# Patient Record
Sex: FEMALE | Race: WHITE | NOT HISPANIC OR LATINO | URBAN - METROPOLITAN AREA
[De-identification: names, ages, dates, MRNs, and addresses within clinical notes are randomized per-mention and may not be internally consistent; named-entity substitution may affect disease eponyms.]

---

## 2018-06-16 ENCOUNTER — EMERGENCY (EMERGENCY)
Facility: HOSPITAL | Age: 53
LOS: 1 days | Discharge: HOME | End: 2018-06-16
Attending: EMERGENCY MEDICINE | Admitting: EMERGENCY MEDICINE
Payer: MEDICAID

## 2018-06-16 VITALS
RESPIRATION RATE: 18 BRPM | SYSTOLIC BLOOD PRESSURE: 107 MMHG | OXYGEN SATURATION: 98 % | TEMPERATURE: 98 F | HEART RATE: 98 BPM | DIASTOLIC BLOOD PRESSURE: 78 MMHG

## 2018-06-16 DIAGNOSIS — M54.5 LOW BACK PAIN: ICD-10-CM

## 2018-06-16 DIAGNOSIS — M51.16 INTERVERTEBRAL DISC DISORDERS WITH RADICULOPATHY, LUMBAR REGION: ICD-10-CM

## 2018-06-16 PROCEDURE — 99283 EMERGENCY DEPT VISIT LOW MDM: CPT

## 2018-06-16 RX ORDER — METHOCARBAMOL 500 MG/1
2 TABLET, FILM COATED ORAL
Qty: 40 | Refills: 0
Start: 2018-06-16 | End: 2018-06-20

## 2018-06-16 RX ORDER — METHOCARBAMOL 500 MG/1
1000 TABLET, FILM COATED ORAL ONCE
Qty: 0 | Refills: 0 | Status: COMPLETED | OUTPATIENT
Start: 2018-06-16 | End: 2018-06-16

## 2018-06-16 RX ORDER — OXYCODONE AND ACETAMINOPHEN 5; 325 MG/1; MG/1
1 TABLET ORAL ONCE
Qty: 0 | Refills: 0 | Status: DISCONTINUED | OUTPATIENT
Start: 2018-06-16 | End: 2018-06-16

## 2018-06-16 RX ORDER — ACETAMINOPHEN 500 MG
650 TABLET ORAL ONCE
Qty: 0 | Refills: 0 | Status: COMPLETED | OUTPATIENT
Start: 2018-06-16 | End: 2018-06-16

## 2018-06-16 RX ADMIN — Medication 650 MILLIGRAM(S): at 14:01

## 2018-06-16 RX ADMIN — OXYCODONE AND ACETAMINOPHEN 1 TABLET(S): 5; 325 TABLET ORAL at 13:39

## 2018-06-16 RX ADMIN — Medication 500 MILLIGRAM(S): at 14:01

## 2018-06-16 RX ADMIN — METHOCARBAMOL 1000 MILLIGRAM(S): 500 TABLET, FILM COATED ORAL at 14:01

## 2018-06-16 NOTE — ED PROVIDER NOTE - MEDICAL DECISION MAKING DETAILS
pain controlled in ED, ambulatory with steady gait, stable for dc home, outpatient back pain followup.

## 2018-06-16 NOTE — ED PROVIDER NOTE - OBJECTIVE STATEMENT
52 y.o. female with c/o exacerbation of chronic L sided lower back pain with sciatica, has had MRI and XR recently which showed DJD/disc disease of L2-L3, has been taking NSAIDS/meloxicam and cyclobenzaprine with no relief of sx.  No numbness or tingling, no weakness, no fatigue, no bowel or bladder incontinence, no urinary retention, no fall no trauma no LOC no seizures.  On arrival into the exam room, pt is laying with her head at the foot of the bed on an upright stretcher.

## 2019-08-29 ENCOUNTER — EMERGENCY (EMERGENCY)
Facility: HOSPITAL | Age: 54
LOS: 1 days | Discharge: HOME | End: 2019-08-29
Attending: EMERGENCY MEDICINE | Admitting: EMERGENCY MEDICINE
Payer: MEDICAID

## 2019-08-29 VITALS
SYSTOLIC BLOOD PRESSURE: 122 MMHG | HEART RATE: 83 BPM | TEMPERATURE: 99 F | DIASTOLIC BLOOD PRESSURE: 84 MMHG | OXYGEN SATURATION: 96 % | RESPIRATION RATE: 18 BRPM

## 2019-08-29 LAB
ALBUMIN SERPL ELPH-MCNC: 3.8 G/DL — SIGNIFICANT CHANGE UP (ref 3.4–5)
ALP SERPL-CCNC: 80 U/L — SIGNIFICANT CHANGE UP (ref 40–120)
ALT FLD-CCNC: 17 U/L — SIGNIFICANT CHANGE UP (ref 12–42)
ANION GAP SERPL CALC-SCNC: 6 MMOL/L — LOW (ref 9–16)
AST SERPL-CCNC: 10 U/L — LOW (ref 15–37)
BASOPHILS NFR BLD AUTO: 0.5 % — SIGNIFICANT CHANGE UP (ref 0–2)
BILIRUB SERPL-MCNC: 0.3 MG/DL — SIGNIFICANT CHANGE UP (ref 0.2–1.2)
BUN SERPL-MCNC: 21 MG/DL — SIGNIFICANT CHANGE UP (ref 7–23)
CALCIUM SERPL-MCNC: 12.3 MG/DL — HIGH (ref 8.5–10.5)
CHLORIDE SERPL-SCNC: 106 MMOL/L — SIGNIFICANT CHANGE UP (ref 96–108)
CO2 SERPL-SCNC: 34 MMOL/L — HIGH (ref 22–31)
CREAT SERPL-MCNC: 0.71 MG/DL — SIGNIFICANT CHANGE UP (ref 0.5–1.3)
EOSINOPHIL NFR BLD AUTO: 2 % — SIGNIFICANT CHANGE UP (ref 0–6)
GLUCOSE SERPL-MCNC: 93 MG/DL — SIGNIFICANT CHANGE UP (ref 70–99)
HCT VFR BLD CALC: 40.8 % — SIGNIFICANT CHANGE UP (ref 34.5–45)
HGB BLD-MCNC: 13.9 G/DL — SIGNIFICANT CHANGE UP (ref 11.5–15.5)
IMM GRANULOCYTES NFR BLD AUTO: 0.4 % — SIGNIFICANT CHANGE UP (ref 0–1.5)
LYMPHOCYTES # BLD AUTO: 33.9 % — SIGNIFICANT CHANGE UP (ref 13–44)
MAGNESIUM SERPL-MCNC: 1.9 MG/DL — SIGNIFICANT CHANGE UP (ref 1.6–2.6)
MCHC RBC-ENTMCNC: 31.1 PG — SIGNIFICANT CHANGE UP (ref 27–34)
MCHC RBC-ENTMCNC: 34.1 G/DL — SIGNIFICANT CHANGE UP (ref 32–36)
MCV RBC AUTO: 91.3 FL — SIGNIFICANT CHANGE UP (ref 80–100)
MONOCYTES NFR BLD AUTO: 6.8 % — SIGNIFICANT CHANGE UP (ref 2–14)
NEUTROPHILS NFR BLD AUTO: 56.4 % — SIGNIFICANT CHANGE UP (ref 43–77)
PHOSPHATE SERPL-MCNC: 4.4 MG/DL — SIGNIFICANT CHANGE UP (ref 2.5–4.5)
PLATELET # BLD AUTO: 260 K/UL — SIGNIFICANT CHANGE UP (ref 150–400)
POTASSIUM SERPL-MCNC: 4.6 MMOL/L — SIGNIFICANT CHANGE UP (ref 3.5–5.3)
POTASSIUM SERPL-SCNC: 4.6 MMOL/L — SIGNIFICANT CHANGE UP (ref 3.5–5.3)
PROT SERPL-MCNC: 7.2 G/DL — SIGNIFICANT CHANGE UP (ref 6.4–8.2)
RBC # BLD: 4.47 M/UL — SIGNIFICANT CHANGE UP (ref 3.8–5.2)
RBC # FLD: 13.8 % — SIGNIFICANT CHANGE UP (ref 10.3–14.5)
SODIUM SERPL-SCNC: 146 MMOL/L — HIGH (ref 132–145)
TSH SERPL-MCNC: 1.64 UIU/ML — SIGNIFICANT CHANGE UP (ref 0.36–3.74)
WBC # BLD: 10.1 K/UL — SIGNIFICANT CHANGE UP (ref 3.8–10.5)
WBC # FLD AUTO: 10.1 K/UL — SIGNIFICANT CHANGE UP (ref 3.8–10.5)

## 2019-08-29 PROCEDURE — 99284 EMERGENCY DEPT VISIT MOD MDM: CPT | Mod: 25

## 2019-08-29 PROCEDURE — 93010 ELECTROCARDIOGRAM REPORT: CPT

## 2019-08-29 PROCEDURE — 71046 X-RAY EXAM CHEST 2 VIEWS: CPT | Mod: 26

## 2019-08-29 RX ORDER — SODIUM CHLORIDE 9 MG/ML
1000 INJECTION INTRAMUSCULAR; INTRAVENOUS; SUBCUTANEOUS ONCE
Refills: 0 | Status: COMPLETED | OUTPATIENT
Start: 2019-08-29 | End: 2019-08-29

## 2019-08-29 RX ORDER — SODIUM CHLORIDE 9 MG/ML
2000 INJECTION INTRAMUSCULAR; INTRAVENOUS; SUBCUTANEOUS ONCE
Refills: 0 | Status: COMPLETED | OUTPATIENT
Start: 2019-08-29 | End: 2019-08-29

## 2019-08-29 RX ORDER — FUROSEMIDE 40 MG
20 TABLET ORAL ONCE
Refills: 0 | Status: COMPLETED | OUTPATIENT
Start: 2019-08-29 | End: 2019-08-29

## 2019-08-29 RX ADMIN — Medication 20 MILLIGRAM(S): at 23:34

## 2019-08-29 RX ADMIN — SODIUM CHLORIDE 2000 MILLILITER(S): 9 INJECTION INTRAMUSCULAR; INTRAVENOUS; SUBCUTANEOUS at 23:34

## 2019-08-29 RX ADMIN — SODIUM CHLORIDE 1000 MILLILITER(S): 9 INJECTION INTRAMUSCULAR; INTRAVENOUS; SUBCUTANEOUS at 23:30

## 2019-08-29 RX ADMIN — SODIUM CHLORIDE 1000 MILLILITER(S): 9 INJECTION INTRAMUSCULAR; INTRAVENOUS; SUBCUTANEOUS at 23:22

## 2019-08-29 NOTE — ED ADULT NURSE NOTE - NS ED NURSE IV DC DT
Patient is looking for a refill of :   ADAlimumab (HUMIRA PEN) 40 MG/0.8ML pen-injector kit ,   needs to be sent to [address] [place] [address] fax number is [phone]     [facility] is the pharmacy they then send it to and the phone number for them is : [phone]     Please advise.     Thank you 30-Aug-2019 05:00

## 2019-08-29 NOTE — ED ADULT NURSE NOTE - OBJECTIVE STATEMENT
Pt sent PMD for abnormal labs.  In particular elevated calcium.  Pt with no complaints of pain.  No SOB.

## 2019-08-29 NOTE — ED PROVIDER NOTE - PATIENT PORTAL LINK FT
You can access the FollowMyHealth Patient Portal offered by [facility] by registering at the following website: [email or web address]. By joining [employer]’s FollowMyHealth portal, you will also be able to view your health information using other applications (apps) compatible with our system.

## 2019-08-29 NOTE — ED PROVIDER NOTE - OBJECTIVE STATEMENT
52 yo female pt, hx of OA and psoriatic arthritis, presents to ED after being called by Rheumatologist and told to come in to get evaluated for hypercalcemia on outpt labs. Pt got labs done to get started on biologics for Psoriatic arthritis. Pt denies any symptoms like cramping, paresthesias, etc. no chest pain, no sob, no neck  pain. Hx of hypothyroidism and OA on neck and lower back w hx of lower back surgery

## 2019-08-29 NOTE — ED PROVIDER NOTE - PROGRESS NOTE DETAILS
Pt with isolated asymptomatic hypercalcemia, will hydrate and give one dose of furosemide and repeat. EKG w no changes. Explained to pt extensive DDx include high PTH, malignancy, etc. She is aware that if repeat is better she might follow up with primary care for rest of work up. Will sign out pending tx and repeat BMP.

## 2019-08-29 NOTE — ED ADULT NURSE NOTE - NSIMPLEMENTINTERV_GEN_ALL_ED
Implemented All Universal Safety Interventions:  [place] to call system. Call bell, personal items and telephone within reach. Instruct patient to call for assistance. Room bathroom lighting operational. Non-slip footwear when patient is off stretcher. Physically safe environment: no spills, clutter or unnecessary equipment. Stretcher in lowest position, wheels locked, appropriate side rails in place.

## 2019-08-29 NOTE — ED ADULT TRIAGE NOTE - CHIEF COMPLAINT QUOTE
Pt complaining abnormal lab results. Pt was told to go to emergency room by her rheumatologist for calcium of  12.8 . Pt denies chest pain sob.

## 2019-08-30 VITALS
TEMPERATURE: 98 F | HEART RATE: 79 BPM | OXYGEN SATURATION: 99 % | RESPIRATION RATE: 16 BRPM | SYSTOLIC BLOOD PRESSURE: 100 MMHG | DIASTOLIC BLOOD PRESSURE: 60 MMHG

## 2019-08-30 PROBLEM — M54.5 LOW BACK PAIN: Chronic | Status: ACTIVE | Noted: 2018-06-16

## 2019-08-30 PROBLEM — M51.16 INTERVERTEBRAL DISC DISORDERS WITH RADICULOPATHY, LUMBAR REGION: Chronic | Status: ACTIVE | Noted: 2018-06-16

## 2019-08-30 LAB
ANION GAP SERPL CALC-SCNC: 3 MMOL/L — LOW (ref 9–16)
BUN SERPL-MCNC: 16 MG/DL — SIGNIFICANT CHANGE UP (ref 7–23)
CA-I BLD-SCNC: 1.55 MMOL/L — HIGH (ref 1.12–1.3)
CALCIUM SERPL-MCNC: 10.8 MG/DL — HIGH (ref 8.5–10.5)
CHLORIDE SERPL-SCNC: 108 MMOL/L — SIGNIFICANT CHANGE UP (ref 96–108)
CO2 SERPL-SCNC: 34 MMOL/L — HIGH (ref 22–31)
CREAT SERPL-MCNC: 0.59 MG/DL — SIGNIFICANT CHANGE UP (ref 0.5–1.3)
GLUCOSE SERPL-MCNC: 84 MG/DL — SIGNIFICANT CHANGE UP (ref 70–99)
POTASSIUM SERPL-MCNC: 4.1 MMOL/L — SIGNIFICANT CHANGE UP (ref 3.5–5.3)
POTASSIUM SERPL-SCNC: 4.1 MMOL/L — SIGNIFICANT CHANGE UP (ref 3.5–5.3)
SODIUM SERPL-SCNC: 145 MMOL/L — SIGNIFICANT CHANGE UP (ref 132–145)

## 2019-08-30 PROCEDURE — 74177 CT ABD & PELVIS W/CONTRAST: CPT | Mod: 26

## 2019-08-30 PROCEDURE — 71260 CT THORAX DX C+: CPT | Mod: 26

## 2019-09-02 DIAGNOSIS — Z79.899 OTHER LONG TERM (CURRENT) DRUG THERAPY: ICD-10-CM

## 2019-09-02 DIAGNOSIS — R79.89 OTHER SPECIFIED ABNORMAL FINDINGS OF BLOOD CHEMISTRY: ICD-10-CM

## 2019-09-02 DIAGNOSIS — Z88.0 ALLERGY STATUS TO PENICILLIN: ICD-10-CM

## 2019-09-02 DIAGNOSIS — Z79.891 LONG TERM (CURRENT) USE OF OPIATE ANALGESIC: ICD-10-CM

## 2019-09-02 DIAGNOSIS — E83.52 HYPERCALCEMIA: ICD-10-CM

## 2019-09-02 DIAGNOSIS — Z79.1 LONG TERM (CURRENT) USE OF NON-STEROIDAL ANTI-INFLAMMATORIES (NSAID): ICD-10-CM

## 2019-09-13 ENCOUNTER — INPATIENT (INPATIENT)
Facility: HOSPITAL | Age: 54
LOS: 3 days | Discharge: HOME | DRG: 918 | End: 2019-09-17
Attending: STUDENT IN AN ORGANIZED HEALTH CARE EDUCATION/TRAINING PROGRAM | Admitting: STUDENT IN AN ORGANIZED HEALTH CARE EDUCATION/TRAINING PROGRAM
Payer: COMMERCIAL

## 2019-09-13 VITALS
DIASTOLIC BLOOD PRESSURE: 84 MMHG | OXYGEN SATURATION: 98 % | TEMPERATURE: 99 F | HEART RATE: 85 BPM | SYSTOLIC BLOOD PRESSURE: 138 MMHG | RESPIRATION RATE: 18 BRPM

## 2019-09-13 DIAGNOSIS — L40.50 ARTHROPATHIC PSORIASIS, UNSPECIFIED: ICD-10-CM

## 2019-09-13 DIAGNOSIS — Z91.89 OTHER SPECIFIED PERSONAL RISK FACTORS, NOT ELSEWHERE CLASSIFIED: ICD-10-CM

## 2019-09-13 DIAGNOSIS — F17.200 NICOTINE DEPENDENCE, UNSPECIFIED, UNCOMPLICATED: ICD-10-CM

## 2019-09-13 DIAGNOSIS — K59.00 CONSTIPATION, UNSPECIFIED: ICD-10-CM

## 2019-09-13 DIAGNOSIS — R63.8 OTHER SYMPTOMS AND SIGNS CONCERNING FOOD AND FLUID INTAKE: ICD-10-CM

## 2019-09-13 DIAGNOSIS — Z98.82 BREAST IMPLANT STATUS: Chronic | ICD-10-CM

## 2019-09-13 DIAGNOSIS — E83.52 HYPERCALCEMIA: ICD-10-CM

## 2019-09-13 DIAGNOSIS — Z29.9 ENCOUNTER FOR PROPHYLACTIC MEASURES, UNSPECIFIED: ICD-10-CM

## 2019-09-13 DIAGNOSIS — M54.5 LOW BACK PAIN: ICD-10-CM

## 2019-09-13 DIAGNOSIS — R93.89 ABNORMAL FINDINGS ON DIAGNOSTIC IMAGING OF OTHER SPECIFIED BODY STRUCTURES: ICD-10-CM

## 2019-09-13 DIAGNOSIS — J43.9 EMPHYSEMA, UNSPECIFIED: ICD-10-CM

## 2019-09-13 DIAGNOSIS — Z98.890 OTHER SPECIFIED POSTPROCEDURAL STATES: Chronic | ICD-10-CM

## 2019-09-13 LAB
ALBUMIN SERPL ELPH-MCNC: 3.2 G/DL — LOW (ref 3.4–5)
ALBUMIN SERPL ELPH-MCNC: 3.5 G/DL — SIGNIFICANT CHANGE UP (ref 3.3–5)
ALP SERPL-CCNC: 69 U/L — SIGNIFICANT CHANGE UP (ref 40–120)
ALP SERPL-CCNC: 90 U/L — SIGNIFICANT CHANGE UP (ref 40–120)
ALT FLD-CCNC: 17 U/L — SIGNIFICANT CHANGE UP (ref 10–45)
ALT FLD-CCNC: 19 U/L — SIGNIFICANT CHANGE UP (ref 12–42)
ANION GAP SERPL CALC-SCNC: 4 MMOL/L — LOW (ref 9–16)
ANION GAP SERPL CALC-SCNC: 5 MMOL/L — LOW (ref 9–16)
ANION GAP SERPL CALC-SCNC: 5 MMOL/L — LOW (ref 9–16)
ANION GAP SERPL CALC-SCNC: 7 MMOL/L — LOW (ref 9–16)
ANION GAP SERPL CALC-SCNC: 7 MMOL/L — SIGNIFICANT CHANGE UP (ref 5–17)
ANION GAP SERPL CALC-SCNC: 9 MMOL/L — SIGNIFICANT CHANGE UP (ref 5–17)
AST SERPL-CCNC: 14 U/L — LOW (ref 15–37)
AST SERPL-CCNC: 16 U/L — SIGNIFICANT CHANGE UP (ref 10–40)
BILIRUB SERPL-MCNC: 0.1 MG/DL — LOW (ref 0.2–1.2)
BILIRUB SERPL-MCNC: <0.2 MG/DL — SIGNIFICANT CHANGE UP (ref 0.2–1.2)
BUN SERPL-MCNC: 25 MG/DL — HIGH (ref 7–23)
BUN SERPL-MCNC: 26 MG/DL — HIGH (ref 7–23)
BUN SERPL-MCNC: 28 MG/DL — HIGH (ref 7–23)
BUN SERPL-MCNC: 29 MG/DL — HIGH (ref 7–23)
BUN SERPL-MCNC: 31 MG/DL — HIGH (ref 7–23)
BUN SERPL-MCNC: 32 MG/DL — HIGH (ref 7–23)
CA-I BLD-SCNC: 2.14 MMOL/L — CRITICAL HIGH (ref 1.12–1.3)
CALCIUM SERPL-MCNC: 14.5 MG/DL — CRITICAL HIGH (ref 8.5–10.5)
CALCIUM SERPL-MCNC: 15.1 MG/DL — CRITICAL HIGH (ref 8.4–10.5)
CALCIUM SERPL-MCNC: 15.2 MG/DL — CRITICAL HIGH (ref 8.4–10.5)
CALCIUM SERPL-MCNC: >15 MG/DL — CRITICAL HIGH (ref 8.5–10.5)
CHLORIDE SERPL-SCNC: 104 MMOL/L — SIGNIFICANT CHANGE UP (ref 96–108)
CHLORIDE SERPL-SCNC: 105 MMOL/L — SIGNIFICANT CHANGE UP (ref 96–108)
CHLORIDE SERPL-SCNC: 107 MMOL/L — SIGNIFICANT CHANGE UP (ref 96–108)
CHLORIDE SERPL-SCNC: 107 MMOL/L — SIGNIFICANT CHANGE UP (ref 96–108)
CHLORIDE SERPL-SCNC: 109 MMOL/L — HIGH (ref 96–108)
CHLORIDE SERPL-SCNC: 110 MMOL/L — HIGH (ref 96–108)
CO2 SERPL-SCNC: 28 MMOL/L — SIGNIFICANT CHANGE UP (ref 22–31)
CO2 SERPL-SCNC: 29 MMOL/L — SIGNIFICANT CHANGE UP (ref 22–31)
CO2 SERPL-SCNC: 30 MMOL/L — SIGNIFICANT CHANGE UP (ref 22–31)
CO2 SERPL-SCNC: 30 MMOL/L — SIGNIFICANT CHANGE UP (ref 22–31)
CO2 SERPL-SCNC: 31 MMOL/L — SIGNIFICANT CHANGE UP (ref 22–31)
CO2 SERPL-SCNC: 31 MMOL/L — SIGNIFICANT CHANGE UP (ref 22–31)
CREAT SERPL-MCNC: 1.11 MG/DL — SIGNIFICANT CHANGE UP (ref 0.5–1.3)
CREAT SERPL-MCNC: 1.12 MG/DL — SIGNIFICANT CHANGE UP (ref 0.5–1.3)
CREAT SERPL-MCNC: 1.17 MG/DL — SIGNIFICANT CHANGE UP (ref 0.5–1.3)
CREAT SERPL-MCNC: 1.19 MG/DL — SIGNIFICANT CHANGE UP (ref 0.5–1.3)
CREAT SERPL-MCNC: 1.2 MG/DL — SIGNIFICANT CHANGE UP (ref 0.5–1.3)
CREAT SERPL-MCNC: 1.24 MG/DL — SIGNIFICANT CHANGE UP (ref 0.5–1.3)
GLUCOSE SERPL-MCNC: 100 MG/DL — HIGH (ref 70–99)
GLUCOSE SERPL-MCNC: 115 MG/DL — HIGH (ref 70–99)
GLUCOSE SERPL-MCNC: 121 MG/DL — HIGH (ref 70–99)
GLUCOSE SERPL-MCNC: 133 MG/DL — HIGH (ref 70–99)
GLUCOSE SERPL-MCNC: 150 MG/DL — HIGH (ref 70–99)
GLUCOSE SERPL-MCNC: 166 MG/DL — HIGH (ref 70–99)
HCT VFR BLD CALC: 30.9 % — LOW (ref 34.5–45)
HCT VFR BLD CALC: 32.6 % — LOW (ref 34.5–45)
HGB BLD-MCNC: 10.3 G/DL — LOW (ref 11.5–15.5)
HGB BLD-MCNC: 11.4 G/DL — LOW (ref 11.5–15.5)
MAGNESIUM SERPL-MCNC: 1.8 MG/DL — SIGNIFICANT CHANGE UP (ref 1.6–2.6)
MAGNESIUM SERPL-MCNC: 1.8 MG/DL — SIGNIFICANT CHANGE UP (ref 1.6–2.6)
MCHC RBC-ENTMCNC: 31.4 PG — SIGNIFICANT CHANGE UP (ref 27–34)
MCHC RBC-ENTMCNC: 31.7 PG — SIGNIFICANT CHANGE UP (ref 27–34)
MCHC RBC-ENTMCNC: 33.3 GM/DL — SIGNIFICANT CHANGE UP (ref 32–36)
MCHC RBC-ENTMCNC: 35 G/DL — SIGNIFICANT CHANGE UP (ref 32–36)
MCV RBC AUTO: 90.6 FL — SIGNIFICANT CHANGE UP (ref 80–100)
MCV RBC AUTO: 94.2 FL — SIGNIFICANT CHANGE UP (ref 80–100)
NRBC # BLD: 0 /100 WBCS — SIGNIFICANT CHANGE UP (ref 0–0)
PHOSPHATE SERPL-MCNC: 3.7 MG/DL — SIGNIFICANT CHANGE UP (ref 2.5–4.5)
PLATELET # BLD AUTO: 212 K/UL — SIGNIFICANT CHANGE UP (ref 150–400)
PLATELET # BLD AUTO: 238 K/UL — SIGNIFICANT CHANGE UP (ref 150–400)
POTASSIUM SERPL-MCNC: 3.1 MMOL/L — LOW (ref 3.5–5.3)
POTASSIUM SERPL-MCNC: 3.6 MMOL/L — SIGNIFICANT CHANGE UP (ref 3.5–5.3)
POTASSIUM SERPL-MCNC: 3.6 MMOL/L — SIGNIFICANT CHANGE UP (ref 3.5–5.3)
POTASSIUM SERPL-MCNC: 3.8 MMOL/L — SIGNIFICANT CHANGE UP (ref 3.5–5.3)
POTASSIUM SERPL-MCNC: 3.9 MMOL/L — SIGNIFICANT CHANGE UP (ref 3.5–5.3)
POTASSIUM SERPL-MCNC: 4 MMOL/L — SIGNIFICANT CHANGE UP (ref 3.5–5.3)
POTASSIUM SERPL-SCNC: 3.1 MMOL/L — LOW (ref 3.5–5.3)
POTASSIUM SERPL-SCNC: 3.6 MMOL/L — SIGNIFICANT CHANGE UP (ref 3.5–5.3)
POTASSIUM SERPL-SCNC: 3.6 MMOL/L — SIGNIFICANT CHANGE UP (ref 3.5–5.3)
POTASSIUM SERPL-SCNC: 3.8 MMOL/L — SIGNIFICANT CHANGE UP (ref 3.5–5.3)
POTASSIUM SERPL-SCNC: 3.9 MMOL/L — SIGNIFICANT CHANGE UP (ref 3.5–5.3)
POTASSIUM SERPL-SCNC: 4 MMOL/L — SIGNIFICANT CHANGE UP (ref 3.5–5.3)
PROT SERPL-MCNC: 5.9 G/DL — LOW (ref 6–8.3)
PROT SERPL-MCNC: 6.7 G/DL — SIGNIFICANT CHANGE UP (ref 6.4–8.2)
RBC # BLD: 3.28 M/UL — LOW (ref 3.8–5.2)
RBC # BLD: 3.6 M/UL — LOW (ref 3.8–5.2)
RBC # FLD: 12.6 % — SIGNIFICANT CHANGE UP (ref 10.3–14.5)
RBC # FLD: 12.8 % — SIGNIFICANT CHANGE UP (ref 10.3–14.5)
SODIUM SERPL-SCNC: 140 MMOL/L — SIGNIFICANT CHANGE UP (ref 132–145)
SODIUM SERPL-SCNC: 142 MMOL/L — SIGNIFICANT CHANGE UP (ref 132–145)
SODIUM SERPL-SCNC: 142 MMOL/L — SIGNIFICANT CHANGE UP (ref 135–145)
SODIUM SERPL-SCNC: 144 MMOL/L — SIGNIFICANT CHANGE UP (ref 132–145)
SODIUM SERPL-SCNC: 145 MMOL/L — SIGNIFICANT CHANGE UP (ref 132–145)
SODIUM SERPL-SCNC: 145 MMOL/L — SIGNIFICANT CHANGE UP (ref 135–145)
WBC # BLD: 10 K/UL — SIGNIFICANT CHANGE UP (ref 3.8–10.5)
WBC # BLD: 7.98 K/UL — SIGNIFICANT CHANGE UP (ref 3.8–10.5)
WBC # FLD AUTO: 10 K/UL — SIGNIFICANT CHANGE UP (ref 3.8–10.5)
WBC # FLD AUTO: 7.98 K/UL — SIGNIFICANT CHANGE UP (ref 3.8–10.5)

## 2019-09-13 PROCEDURE — 93010 ELECTROCARDIOGRAM REPORT: CPT

## 2019-09-13 PROCEDURE — 99285 EMERGENCY DEPT VISIT HI MDM: CPT | Mod: 25

## 2019-09-13 PROCEDURE — 99222 1ST HOSP IP/OBS MODERATE 55: CPT | Mod: GC

## 2019-09-13 PROCEDURE — 93010 ELECTROCARDIOGRAM REPORT: CPT | Mod: 76

## 2019-09-13 RX ORDER — ENOXAPARIN SODIUM 100 MG/ML
40 INJECTION SUBCUTANEOUS DAILY
Refills: 0 | Status: DISCONTINUED | OUTPATIENT
Start: 2019-09-13 | End: 2019-09-16

## 2019-09-13 RX ORDER — POTASSIUM CHLORIDE 20 MEQ
60 PACKET (EA) ORAL ONCE
Refills: 0 | Status: COMPLETED | OUTPATIENT
Start: 2019-09-13 | End: 2019-09-13

## 2019-09-13 RX ORDER — SODIUM CHLORIDE 9 MG/ML
1000 INJECTION INTRAMUSCULAR; INTRAVENOUS; SUBCUTANEOUS ONCE
Refills: 0 | Status: COMPLETED | OUTPATIENT
Start: 2019-09-13 | End: 2019-09-13

## 2019-09-13 RX ORDER — PAMIDRONATE DISODIUM 9 MG/ML
60 INJECTION, SOLUTION INTRAVENOUS ONCE
Refills: 0 | Status: COMPLETED | OUTPATIENT
Start: 2019-09-13 | End: 2019-09-13

## 2019-09-13 RX ORDER — ACETAMINOPHEN 500 MG
975 TABLET ORAL ONCE
Refills: 0 | Status: COMPLETED | OUTPATIENT
Start: 2019-09-13 | End: 2019-09-13

## 2019-09-13 RX ORDER — SODIUM CHLORIDE 9 MG/ML
1000 INJECTION INTRAMUSCULAR; INTRAVENOUS; SUBCUTANEOUS
Refills: 0 | Status: DISCONTINUED | OUTPATIENT
Start: 2019-09-13 | End: 2019-09-14

## 2019-09-13 RX ADMIN — SODIUM CHLORIDE 150 MILLILITER(S): 9 INJECTION INTRAMUSCULAR; INTRAVENOUS; SUBCUTANEOUS at 19:42

## 2019-09-13 RX ADMIN — SODIUM CHLORIDE 1000 MILLILITER(S): 9 INJECTION INTRAMUSCULAR; INTRAVENOUS; SUBCUTANEOUS at 01:39

## 2019-09-13 RX ADMIN — SODIUM CHLORIDE 1000 MILLILITER(S): 9 INJECTION INTRAMUSCULAR; INTRAVENOUS; SUBCUTANEOUS at 03:42

## 2019-09-13 RX ADMIN — Medication 975 MILLIGRAM(S): at 01:38

## 2019-09-13 RX ADMIN — ENOXAPARIN SODIUM 40 MILLIGRAM(S): 100 INJECTION SUBCUTANEOUS at 23:51

## 2019-09-13 RX ADMIN — Medication 60 MILLIEQUIVALENT(S): at 03:40

## 2019-09-13 RX ADMIN — SODIUM CHLORIDE 1000 MILLILITER(S): 9 INJECTION INTRAMUSCULAR; INTRAVENOUS; SUBCUTANEOUS at 02:47

## 2019-09-13 RX ADMIN — SODIUM CHLORIDE 150 MILLILITER(S): 9 INJECTION INTRAMUSCULAR; INTRAVENOUS; SUBCUTANEOUS at 23:51

## 2019-09-13 NOTE — ED ADULT NURSE NOTE - CHPI ED NUR SYMPTOMS NEG
no chills/no dizziness/no tingling/no nausea/no pain/no fever/no vomiting/no weakness/no decreased eating/drinking

## 2019-09-13 NOTE — CONSULT NOTE ADULT - SUBJECTIVE AND OBJECTIVE BOX
HPI: 54yFemale    Age at Dx:  How dx:  Hx and duration of insulin:  Current Therapy:  Hx of hypoglycemia  Hx of DKA/HHS?    Home FSG:  Fasting  Lunch  Dinner  Bed    Hx of other regimens  Complications:  Outpatient Endo:    PMH & Surgical Hx:ABNORMAL LAB RESULT  Handoff  MEWS Score  Chronic lower back pain  Lumbar disc herniation with radiculopathy  Hypercalcemia  No significant past surgical history  ABNORMAL LAB RESULT  14  Hypokalemia      FH:  DM:  Thyroid:  Autoimmune:  Other:    SH:  Smoking  Etoh:  Recreational Drugs:  Social Life:    Current Meds:  sodium chloride 0.9%. 1000 milliLiter(s) IV Continuous <Continuous>      Allergies:  penicillins (Unknown)      ROS:  Denies the following except as indicated.    General: weight loss/weight gain, decreased appetite, fatigue  Eyes: Blurry vision, double vision, visual changes  ENT: Throat pain, changes in voice,   CV: palpitations, SOB, CP, cough  GI: NVD, difficulty swallowing, abdominal pain  : polyuria, dysuria  Endo: abnormal menses, temperature intolerance, decreased libido  MSK: weakness, joint pain  Skin: rash, dryness, diaphoresis  Heme: Easy bruising,bleeding  Neuro: HA, dizziness, lightheadedness, numbness tingling  Psych: Anxiety, Depression    Vital Signs Last 24 Hrs  T(C): 36.4 (13 Sep 2019 18:10), Max: 37.1 (13 Sep 2019 00:03)  T(F): 97.6 (13 Sep 2019 18:10), Max: 98.7 (13 Sep 2019 00:03)  HR: 76 (13 Sep 2019 18:10) (67 - 85)  BP: 144/85 (13 Sep 2019 18:10) (109/66 - 144/85)  BP(mean): --  RR: 18 (13 Sep 2019 18:10) (16 - 20)  SpO2: 96% (13 Sep 2019 18:10) (96% - 100%)  Height (cm): 162.6 (09-13 @ 18:10)  Weight (kg): 56.245 (09-13 @ 18:10)  BMI (kg/m2): 21.3 (09-13 @ 18:10)      Constitutional: wn/wd in NAD.   HEENT: NCAT, MMM, OP clear, EOMI, , no proptosis or lid retraction  Neck: no thyromegaly or palpable thyroid nodules   Respiratory: lungs CTAB.  Cardiovascular: regular rhythm, normal S1 and S2, no audible murmurs, no peripheral edema  GI: soft, NT/ND, no masses/HSM appreciated.  Neurology: no tremors, DTR 2+  Skin: no visible rashes/lesions  Psychiatric: AAO x 3, normal affect/mood.  Ext: radial pulses intact, DP pulses intact, extremities warm, no cyanosis, clubbing or edema.       LABS:                        11.4   10.0  )-----------( 238      ( 13 Sep 2019 00:54 )             32.6     09-13    144  |  110<H>  |  29<H>  ----------------------------<  115<H>  3.9   |  30  |  1.17    Ca    14.5<HH>      13 Sep 2019 07:13  Phos  3.7     09-13  Mg     1.8     09-13    TPro  6.7  /  Alb  3.2<L>  /  TBili  0.1<L>  /  DBili  x   /  AST  14<L>  /  ALT  19  /  AlkPhos  90  09-13          Thyroid Stimulating Hormone, Serum: 1.640 (08-29 @ 22:52)      RADIOLOGY & ADDITIONAL STUDIES:  CAPILLARY BLOOD GLUCOSE            A/P:54y Female    1.      Will continue to monitor     For discharge, pt can continue    Pt can follow up at discharge with [facility] by calling  to make an appointment.   Will discuss case with     and update primary team HPI: This patient is a 55 YO female with medical history of psoariasis for the past 30 years, h/o hypothyroidism for past 20 years - not on any treatment, left leg neuropathy/myoclonus - ?restless leg, DDD pf the neck and spine with herniated disk s/p surgery 1 year ago was sent to the ED by her rhuematologist when her blood work showed elevate calcium level. She was admitted in the ED 1 month with same complaints of elevate calcium in 12.3. She came to ED, was given fluids and lasix and discharged for OP work-up. She was keeping herself hydrated well and went to her rheumatologist to repeat her blood work which showed elevated calcium and she was asked to come to the ED for mangement.  For her psoariasis, she has been on treatment on and off for the past 30 years - was on methotextrate and has been on topical steroid creams for a long time. She was on topical steroid cream for the past 2 months. She was started on biological agents 1 week ago - she has received just one shot      PMH & Surgical Hx:ABNORMAL LAB RESULT  Handoff  MEWS Score  Chronic lower back pain  Lumbar disc herniation with radiculopathy  Hypercalcemia  No significant past surgical history  ABNORMAL LAB RESULT  14  Hypokalemia      FH:  DM:  Thyroid:  Autoimmune:  Other:    SH:  Smoking  Etoh:  Recreational Drugs:  Social Life:    Current Meds:  sodium chloride 0.9%. 1000 milliLiter(s) IV Continuous <Continuous>      Allergies:  penicillins (Unknown)      ROS:  Denies the following except as indicated.    General: weight loss/weight gain, decreased appetite, fatigue  Eyes: Blurry vision, double vision, visual changes  ENT: Throat pain, changes in voice,   CV: palpitations, SOB, CP, cough  GI: NVD, difficulty swallowing, abdominal pain  : polyuria, dysuria  Endo: abnormal menses, temperature intolerance, decreased libido  MSK: weakness, joint pain  Skin: rash, dryness, diaphoresis  Heme: Easy bruising,bleeding  Neuro: HA, dizziness, lightheadedness, numbness tingling  Psych: Anxiety, Depression    Vital Signs Last 24 Hrs  T(C): 36.4 (13 Sep 2019 18:10), Max: 37.1 (13 Sep 2019 00:03)  T(F): 97.6 (13 Sep 2019 18:10), Max: 98.7 (13 Sep 2019 00:03)  HR: 76 (13 Sep 2019 18:10) (67 - 85)  BP: 144/85 (13 Sep 2019 18:10) (109/66 - 144/85)  BP(mean): --  RR: 18 (13 Sep 2019 18:10) (16 - 20)  SpO2: 96% (13 Sep 2019 18:10) (96% - 100%)  Height (cm): 162.6 (09-13 @ 18:10)  Weight (kg): 56.245 (09-13 @ 18:10)  BMI (kg/m2): 21.3 (09-13 @ 18:10)      Constitutional: wn/wd in NAD.   HEENT: NCAT, MMM, OP clear, EOMI, , no proptosis or lid retraction  Neck: no thyromegaly or palpable thyroid nodules   Respiratory: lungs CTAB.  Cardiovascular: regular rhythm, normal S1 and S2, no audible murmurs, no peripheral edema  GI: soft, NT/ND, no masses/HSM appreciated.  Neurology: no tremors, DTR 2+  Skin: no visible rashes/lesions  Psychiatric: AAO x 3, normal affect/mood.  Ext: radial pulses intact, DP pulses intact, extremities warm, no cyanosis, clubbing or edema.       LABS:                        11.4   10.0  )-----------( 238      ( 13 Sep 2019 00:54 )             32.6     09-13    144  |  110<H>  |  29<H>  ----------------------------<  115<H>  3.9   |  30  |  1.17    Ca    14.5<HH>      13 Sep 2019 07:13  Phos  3.7     09-13  Mg     1.8     09-13    TPro  6.7  /  Alb  3.2<L>  /  TBili  0.1<L>  /  DBili  x   /  AST  14<L>  /  ALT  19  /  AlkPhos  90  09-13          Thyroid Stimulating Hormone, Serum: 1.640 (08-29 @ 22:52)      RADIOLOGY & ADDITIONAL STUDIES:  CAPILLARY BLOOD GLUCOSE            A/P:54y Female    1.      Will continue to monitor     For discharge, pt can continue    Pt can follow up at discharge with [facility] by calling  to make an appointment.   Will discuss case with     and update primary team HPI: This patient is a 55 YO female with medical history of psoariasis for the past 30 years, h/o hypothyroidism for past 20 years - not on any treatment, left leg neuropathy/myoclonus - ?restless leg, DDD pf the neck and spine with herniated disk s/p surgery 1 year ago was sent to the ED by her rhuematologist when her blood work showed elevate calcium level. She was admitted in the ED 1 month with same complaints of elevate calcium in 12.3. She came to ED, was given fluids and lasix and discharged for OP work-up. She was keeping herself hydrated well and went to her rheumatologist to repeat her blood work which showed elevated calcium and she was asked to come to the ED for mangement.  For her psoariasis, she has been on treatment on and off for the past 30 years - was on methotextrate and has been on topical steroid creams for a long time. She was on topical steroid cream for the past 2 months. She was started on biological agents 1 week ago - she has received just one shot      PMH & Surgical Hx:ABNORMAL LAB RESULT  Handoff  MEWS Score  Chronic lower back pain  Lumbar disc herniation with radiculopathy  Hypercalcemia  No significant past surgical history  ABNORMAL LAB RESULT  14  Hypokalemia      FH:  DM:  Thyroid:  Autoimmune:  Other:    SH:  Smoking  Etoh:  Recreational Drugs:  Social Life:    Current Meds:  sodium chloride 0.9%. 1000 milliLiter(s) IV Continuous <Continuous>      Allergies:  penicillins (Unknown)      ROS:  Denies the following except as indicated.    General: weight loss/weight gain, decreased appetite, fatigue  Eyes: Blurry vision, double vision, visual changes  ENT: Throat pain, changes in voice,   CV: palpitations, SOB, CP, cough  GI: NVD, difficulty swallowing, abdominal pain  : polyuria, dysuria  Endo: abnormal menses, temperature intolerance, decreased libido  MSK: weakness, joint pain  Skin: rash, dryness, diaphoresis  Heme: Easy bruising,bleeding  Neuro: HA, dizziness, lightheadedness, numbness tingling  Psych: Anxiety, Depression    Vital Signs Last 24 Hrs  T(C): 36.4 (13 Sep 2019 18:10), Max: 37.1 (13 Sep 2019 00:03)  T(F): 97.6 (13 Sep 2019 18:10), Max: 98.7 (13 Sep 2019 00:03)  HR: 76 (13 Sep 2019 18:10) (67 - 85)  BP: 144/85 (13 Sep 2019 18:10) (109/66 - 144/85)  BP(mean): --  RR: 18 (13 Sep 2019 18:10) (16 - 20)  SpO2: 96% (13 Sep 2019 18:10) (96% - 100%)  Height (cm): 162.6 (09-13 @ 18:10)  Weight (kg): 56.245 (09-13 @ 18:10)  BMI (kg/m2): 21.3 (09-13 @ 18:10)      Constitutional: wn/wd in NAD.   HEENT: NCAT, MMM, OP clear, EOMI, , no proptosis or lid retraction  Neck: no thyromegaly or palpable thyroid nodules   Respiratory: lungs CTAB.  Cardiovascular: regular rhythm, normal S1 and S2, no audible murmurs, no peripheral edema  GI: soft, NT/ND, no masses/HSM appreciated.  Neurology: no tremors, DTR 2+  Skin: no visible rashes/lesions  Psychiatric: AAO x 3, normal affect/mood.  Ext: radial pulses intact, DP pulses intact, extremities warm, no cyanosis, clubbing or edema.       LABS:                        11.4   10.0  )-----------( 238      ( 13 Sep 2019 00:54 )             32.6     09-13    144  |  110<H>  |  29<H>  ----------------------------<  115<H>  3.9   |  30  |  1.17    Ca    14.5<HH>      13 Sep 2019 07:13  Phos  3.7     09-13  Mg     1.8     09-13    TPro  6.7  /  Alb  3.2<L>  /  TBili  0.1<L>  /  DBili  x   /  AST  14<L>  /  ALT  19  /  AlkPhos  90  09-13          Thyroid Stimulating Hormone, Serum: 1.640 (08-29 @ 22:52)      RADIOLOGY & ADDITIONAL STUDIES:  CAPILLARY BLOOD GLUCOSE            A/P: This patient is a 55 YO female with medical history of psoariasis for the past 30 years, h/o hypothyroidism for past 20 years - not on any treatment, left leg neuropathy/myoclonus - ?restless leg, DDD pf the neck and spine with herniated disk s/p surgery     1.      Will continue to monitor     For discharge, pt can continue    Pt can follow up at discharge with [facility] by calling  to make an appointment.   Will discuss case with     and update primary team HPI: This patient is a 53 YO female with medical history of psoariasis for the past 30 years, h/o hypothyroidism for past 20 years - not on any treatment, left leg neuropathy/myoclonus - ?restless leg, DDD pf the neck and spine with herniated disk s/p surgery 1 year ago was sent to the ED by her rhuematologist when her blood work showed elevate calcium level. She was admitted in the ED 1 month with same complaints of elevate calcium in 12.3. She came to ED, was given fluids and lasix and discharged for OP work-up. She was keeping herself hydrated well and went to her rheumatologist to repeat her blood work which showed elevated calcium and she was asked to come to the ED for mangement for calcium of 16.4.  For her psoariasis, she has been on treatment on and off for the past 30 years - was on methotextrate and has been on topical steroid creams for a long time. She was on topical steroid cream for the past 2 months. She was started on biological agents 1 week ago - she has received just one shot a week ago.  Endocrinology was consulted for her hypercalcemia management. Patient denies any increased thirst, urination, denies headache, fatigue, denies memory issues, lethargy, depression or anxiety. On ROS she acknowledges lower back pain and weight loss. Pt with hx of lower back pain in the past which resolved last year after  l4-l5 laminectomy. Currently has aching lower back pain ,not radiating down legs, not associated with LE weakness or numbness. Patient also acknowledges weight loss of 35 pounds since MAY when she made major lifestyle changes. Her diet only consists of green leafy vegetables' , salads and cheese and she has been working with PT /swimming over the summer. Pt with chronic constipation for many years which improved after May when she made changes to her diet. She currently denies constipation and has atleast one BM a day.   OF NOTE Pt is on vitamin D supplements -30 thousand to 40 thousand IU a day for the past 2 months which she obtains over the counter. Patient was told last year by her rheumatologist she is vitamin d deficient .   Upon arrival to ed vitals afebrile, hr 85, /84, satting well on ra   EKG with qtc 377, HR 77  Labs notable for wbc 10, hg 11.4, ionized calcium 2.14, cr 1.17, bun 29, calcium total serum 14.5, albumin 3.2   Imaging done on 30th august (ct chest abdomen with contrast ) notable for Several bilateral micronodules measuring up to 5 mm in the right upper   lobe. few nonspecific subcentimeter hepatic hypodensity   Enlarged fibroid uterus, with a somewhat atypical appearance, although nonspecific. Follow-up MRI pelvis is recommended to exclude   potential malignant degeneration. Wall thickening of the gastric antrum which may represent gastritis.  Colonic fecal retention.  Emphysema.  patient given 2 L NS and started on maintenance fluids @ 150 cc /hr  patient admitted for further management of hypercalcemia         PMH & Surgical Hx:ABNORMAL LAB RESULT  Handoff  MEWS Score  Chronic lower back pain  Lumbar disc herniation with radiculopathy  Hypercalcemia  No significant past surgical history  ABNORMAL LAB RESULT  14  Hypokalemia      FH:  DM:  Thyroid:  Autoimmune:  Other:    SH:  Smoking  Etoh:  Recreational Drugs:  Social Life:    Current Meds:  sodium chloride 0.9%. 1000 milliLiter(s) IV Continuous <Continuous>      Allergies:  penicillins (Unknown)      ROS:  Denies the following except as indicated.    General: weight loss/weight gain, decreased appetite, fatigue  Eyes: Blurry vision, double vision, visual changes  ENT: Throat pain, changes in voice,   CV: palpitations, SOB, CP, cough  GI: NVD, difficulty swallowing, abdominal pain  : polyuria, dysuria  Endo: abnormal menses, temperature intolerance, decreased libido  MSK: weakness, joint pain  Skin: rash, dryness, diaphoresis  Heme: Easy bruising,bleeding  Neuro: HA, dizziness, lightheadedness, numbness tingling  Psych: Anxiety, Depression    Vital Signs Last 24 Hrs  T(C): 36.4 (13 Sep 2019 18:10), Max: 37.1 (13 Sep 2019 00:03)  T(F): 97.6 (13 Sep 2019 18:10), Max: 98.7 (13 Sep 2019 00:03)  HR: 76 (13 Sep 2019 18:10) (67 - 85)  BP: 144/85 (13 Sep 2019 18:10) (109/66 - 144/85)  BP(mean): --  RR: 18 (13 Sep 2019 18:10) (16 - 20)  SpO2: 96% (13 Sep 2019 18:10) (96% - 100%)  Height (cm): 162.6 (09-13 @ 18:10)  Weight (kg): 56.245 (09-13 @ 18:10)  BMI (kg/m2): 21.3 (09-13 @ 18:10)      Constitutional: wn/wd in NAD.   HEENT: NCAT, MMM, OP clear, EOMI, , no proptosis or lid retraction  Neck: no thyromegaly or palpable thyroid nodules   Respiratory: lungs CTAB.  Cardiovascular: regular rhythm, normal S1 and S2, no audible murmurs, no peripheral edema  GI: soft, NT/ND, no masses/HSM appreciated.  Neurology: no tremors, DTR 2+  Skin: no visible rashes/lesions  Psychiatric: AAO x 3, normal affect/mood.  Ext: radial pulses intact, DP pulses intact, extremities warm, no cyanosis, clubbing or edema.       LABS:                        11.4   10.0  )-----------( 238      ( 13 Sep 2019 00:54 )             32.6     09-13    144  |  110<H>  |  29<H>  ----------------------------<  115<H>  3.9   |  30  |  1.17    Ca    14.5<HH>      13 Sep 2019 07:13  Phos  3.7     09-13  Mg     1.8     09-13    TPro  6.7  /  Alb  3.2<L>  /  TBili  0.1<L>  /  DBili  x   /  AST  14<L>  /  ALT  19  /  AlkPhos  90  09-13          Thyroid Stimulating Hormone, Serum: 1.640 (08-29 @ 22:52)      RADIOLOGY & ADDITIONAL STUDIES:  CAPILLARY BLOOD GLUCOSE            A/P: This patient is a 53 YO female with medical history of psoariasis for the past 30 years, h/o hypothyroidism for past 20 years - not on any treatment, left leg neuropathy/myoclonus - ?restless leg, DDD pf the neck and spine with herniated disk s/p surgery     1.      Will continue to monitor     For discharge, pt can continue    Pt can follow up at discharge with [facility] by calling  to make an appointment.   Will discuss case with     and update primary team HPI: This patient is a 55 YO female with medical history of psoariasis for the past 30 years, h/o hypothyroidism for past 20 years - not on any treatment, left leg neuropathy/myoclonus - ?restless leg, DDD pf the neck and spine with herniated disk s/p surgery 1 year ago was sent to the ED by her rhuematologist when her blood work showed elevate calcium level. She was admitted in the ED 1 month with same complaints of elevate calcium in 12.3. She came to ED, was given fluids and lasix and discharged for OP work-up. She was keeping herself hydrated well and went to her rheumatologist to repeat her blood work which showed elevated calcium and she was asked to come to the ED for mangement for calcium of 16.4.  For her psoariasis, she has been on treatment on and off for the past 30 years - was on methotextrate and has been on topical steroid creams for a long time. She was on topical steroid cream for the past 2 months. She was started on biological agents 1 week ago - she has received just one shot a week ago.  Endocrinology was consulted for her hypercalcemia management. Patient denies any increased thirst, urination, denies headache, fatigue, denies memory issues, lethargy, depression or anxiety. On ROS she acknowledges lower back pain and weight loss. Pt with hx of lower back pain in the past which resolved last year after  l4-l5 laminectomy. Currently has aching lower back pain ,not radiating down legs, not associated with LE weakness or numbness. Patient also acknowledges weight loss of 35 pounds since MAY when she made major lifestyle changes. Her anti-inflammatory diet only consists of green leafy vegetables' , salads and cheese and she has been working with PT /swimming over the summer. She currently denies constipation and has atleast one BM a day.   OF NOTE Pt was taking OTC vitamin D supplements -30 thousand to 40 thousand IU a day for the past 2 months for her Vitamin d deficiency. ED vitals afebrile, hr 85, /84, saturating well on RA. Labs notable for wbc 10, hg 11.4, ionized calcium 2.14, cr 1.17, bun 29, calcium total serum 14.5, albumin 3.2. CT Imaging done on  (ct chest abdomen with contrast ) notable for Several bilateral micronodules measuring up to 5 mm in the right upper lobe. few nonspecific subcentimeter hepatic hypodensity   Enlarged fibroid uterus, with a somewhat atypical appearance, although nonspecific. Follow-up MRI pelvis was recommended to exclude   potential malignant degeneration. Wall thickening of the gastric antrum which may represent gastritis.  Colonic fecal retention.  Emphysema. Patient was given 2 L NS and started on maintenance fluids @ 150 cc /hr.   She was also telling that she was diagnosed with hypothyroidism and was on both T4 and T3 supplementation for about 1 year - 10 years ago.     PMH & Surgical Hx:  Chronic lower back pain  Lumbar disc herniation with radiculopathy  Hypercalcemia  B/l breast implants 20 years ago      FH:  DM: in father  Thyroid: mother and sibling had some  Other: lymphoma in cousin    SH:  Smokin pack year smoking history - currently down to 3 cigs/day  Etoh: social drinking  Recreational Drugs: none    Current Meds:  sodium chloride 0.9%. 1000 milliLiter(s) IV Continuous <Continuous>      Allergies:  penicillins (Unknown)      ROS:  Denies the following except as indicated.    General: weight gain, decreased appetite, fatigue  Eyes: Blurry vision, double vision, visual changes  ENT: Throat pain, changes in voice,   CV: palpitations, SOB, CP, cough  GI: NVD, difficulty swallowing, abdominal pain  : polyuria, dysuria  Endo: abnormal menses, temperature intolerance, decreased libido  MSK: weakness, joint pain  Skin: diaphoresis  Heme: Easy bruising,bleeding  Neuro: HA, dizziness, lightheadedness,   Psych: Anxiety, Depression    Vital Signs Last 24 Hrs  T(C): 36.4 (13 Sep 2019 18:10), Max: 37.1 (13 Sep 2019 00:03)  T(F): 97.6 (13 Sep 2019 18:10), Max: 98.7 (13 Sep 2019 00:03)  HR: 76 (13 Sep 2019 18:10) (67 - 85)  BP: 144/85 (13 Sep 2019 18:10) (109/66 - 144/85)  BP(mean): --  RR: 18 (13 Sep 2019 18:10) (16 - 20)  SpO2: 96% (13 Sep 2019 18:10) (96% - 100%)  Height (cm): 162.6 ( @ 18:10)  Weight (kg): 56.245 ( @ 18:10)  BMI (kg/m2): 21.3 ( @ 18:10)      Constitutional: wn/wd in NAD.   HEENT: NCAT, MMM, OP clear, EOMI, , no proptosis or lid retraction  Neck: no thyromegaly or palpable thyroid nodules   Respiratory: lungs CTAB.  Cardiovascular: regular rhythm, normal S1 and S2, no audible murmurs, no peripheral edema  GI: soft, NT/ND, no masses/HSM appreciated.  Neurology: no tremors, DTR 2+  Skin: hypopigmented psoariatic skin lesion seen over the elbows and both shins  Psychiatric: AAO x 3, normal affect/mood.  Ext: radial pulses intact, DP pulses intact, extremities warm, no edema.       LABS:                        11.4   10.0  )-----------( 238      ( 13 Sep 2019 00:54 )             32.6     -    144  |  110<H>  |  29<H>  ----------------------------<  115<H>  3.9   |  30  |  1.17    Ca    14.5<HH>      13 Sep 2019 07:13  Phos  3.7     -  Mg     1.8         TPro  6.7  /  Alb  3.2<L>  /  TBili  0.1<L>  /  DBili  x   /  AST  14<L>  /  ALT  19  /  AlkPhos  90  -          Thyroid Stimulating Hormone, Serum: 1.640 ( @ 22:52)      RADIOLOGY & ADDITIONAL STUDIES:  CAPILLARY BLOOD GLUCOSE            A/P: his patient is a 55 YO female with medical history of psoariasis for the past 30 years, h/o hypothyroidism for past 20 years - not on any treatment, DDD pf the neck and spine with herniated disk s/p surgery got admitted with recurrent episode of hypercalcemia    1.  Hypercalcemia  - etiology unclear - possible Vit D intoxication. Rule out malignancy and granulomatous condition  - Calcium done outside was 16.4. Initially, calcium level > 15  - Labs in hospital showed ionized calcium 2.14, calcium total serum 14.5, albumin 3.2.   - CT Imaging done on  (ct chest abdomen with contrast ) notable for Several bilateral micronodules measuring up to 5 mm in the right upper lobe. few nonspecific subcentimeter hepatic hypodensity   Enlarged fibroid uterus, with a somewhat atypical appearance, although nonspecific. Follow-up MRI pelvis was recommended to exclude potential malignant degeneration. Wall thickening of the gastric antrum which may represent gastritis.   - After fluid bolus, calcium was still elevated to 15.2  - Please given one dose of pamidronate 60mg IV now  - Continue IV fluids  - Please obtain labs including PTH, PTHrP, LDH, ACEi Levels, immunofixation, 25 Vit D and 1,25 Vit D levels, TSH, Free thyroxine and total T3  - monitor calcium levels  - Consider obtaining an MRI to rule out any malignant transformation    Will continue to monitor     For discharge, TBD    Pt can follow up at discharge with [facility] by calling  to make an appointment.   Discussed case with  and updated primary team

## 2019-09-13 NOTE — H&P ADULT - NSHPPHYSICALEXAM_GEN_ALL_CORE
.  VITAL SIGNS:  T(C): 36.4 (09-13-19 @ 21:22), Max: 37.1 (09-13-19 @ 00:03)  T(F): 97.6 (09-13-19 @ 21:22), Max: 98.7 (09-13-19 @ 00:03)  HR: 67 (09-13-19 @ 21:22) (67 - 85)  BP: 130/75 (09-13-19 @ 21:22) (109/66 - 144/85)  BP(mean): --  RR: 17 (09-13-19 @ 21:22) (16 - 20)  SpO2: 100% (09-13-19 @ 21:22) (96% - 100%)  Wt(kg): --    PHYSICAL EXAM:    Constitutional: WDWN resting comfortably in bed; NAD  Head: NC/AT  Eyes: PERRL, EOMI, anicteric sclera  ENT: no nasal discharge; uvula midline, no oropharyngeal erythema or exudates; MMM  Neck: supple; no JVD or thyromegaly  Respiratory: CTA B/L; no W/R/R, no retractions  Cardiac: +S1/S2; RRR; no M/R/G; PMI non-displaced  Gastrointestinal: abdomen soft, NT/ND; no rebound or guarding; +BSx4  Back: spine midline, no bony tenderness or step-offs; no CVAT B/L  Extremities: WWP, no clubbing or cyanosis; no peripheral edema  Musculoskeletal: NROM x4; no joint swelling, tenderness or erythema  Vascular: 2+ radial, femoral, DP/PT pulses B/L  Dermatologic: bilateral elbows and lower extremity (skin around ankle joint ) with depigmentation, dry scaly skin  Lymphatic: no submandibular or cervical LAD  Neurologic: AAOx3; CNII-XII grossly intact; no focal deficits  Psychiatric: affect and characteristics of appearance, verbalizations, behaviors are appropriate

## 2019-09-13 NOTE — H&P ADULT - HISTORY OF PRESENT ILLNESS
Patient with pmh of sciatica s/p L4-L5 laminectomy, hx of thyroid disease in past(was on levothyroxine which was stopped ? ), smoker , hx of psoriasis and psoriatic arthritis(recently started on biologics-first dose 9/9)   presenting after outpatient labs notable for hypercalcemia to 16. Patient recently presented to [facility] ed on august 29th where she was sent after her outpatient labs showed hypercalcemia. On that visit she was managed with fluids and told to be followed up outpatient. She returns today after her rheumatologist noted worsening calcium levels on her out patient labs with most recent calcium done yesterday elevated to 16.  Patient denies any increased thirst, urination, denies headache, fatigue, denies memory issues, lethargy, depression or anxiety. She complains of lower back pain which she has had in the past but resolved last year after  l4-l5 laminectomy. Currently has aching lower back pain ,not radiating down legs. On ros patient acknowledges weight loss of 35 pounds since MAY when she made major lifestyle changes. Her diet only consists of green leafy vegetables' , salads and cheese and she has been working with PT /swimming over the summer. Pt is on vitamin D supplements -30 thousand to 40 thousand IU a day for the past 2 months which she obtains over the counter. Patient was told last year by her rheumatologist she is vitamin d deficient .   Upon arrival to ed vitals afebrile, hr 85, /84, satting well on ra   EKG with qtc 377, HR 77  Labs notable for wbc 10, hg 11.4, ionized calcium 2.14, cr 1.17, bun 29, calcium total serum 14.5, albumin 3.2   patient given 2 L NS and started on maintenance fluids @ 150 cc /hr  patient admitted for further management of hypercalcemia Patient with pmh of sciatica s/p L4-L5 laminectomy, hx of b/l breast implants,  hx of thyroid disease in past(was on levothyroxine which was stopped ? ), smoker , hx of psoriasis and psoriatic arthritis(recently started on biologics-first dose 9/9)   presenting after outpatient labs notable for hypercalcemia to 16. Patient recently presented to [facility] ed on august 29th where she was sent after her outpatient labs showed hypercalcemia. On that visit she was managed with fluids and told to be followed up outpatient. She returns today after her rheumatologist noted worsening calcium levels on her out patient labs with most recent calcium done yesterday elevated to 16.  Patient denies any increased thirst, urination, denies headache, fatigue, denies memory issues, lethargy, depression or anxiety. On ROS she acknowledges lower back pain and weight loss. Pt with hx of lower back pain in the past which resolved last year after  l4-l5 laminectomy. Currently has aching lower back pain ,not radiating down legs, not associated with LE weakness or numbness. Patient also acknowledges weight loss of 35 pounds since MAY when she made major lifestyle changes. Her diet only consists of green leafy vegetables' , salads and cheese and she has been working with PT /swimming over the summer. Pt with chronic constipation for many years which improved after May when she made changes to her diet. She currently denies constipation and has atleast one BM a day.   OF NOTE Pt is on vitamin D supplements -30 thousand to 40 thousand IU a day for the past 2 months which she obtains over the counter. Patient was told last year by her rheumatologist she is vitamin d deficient .   Upon arrival to ed vitals afebrile, hr 85, /84, satting well on ra   EKG with qtc 377, HR 77  Labs notable for wbc 10, hg 11.4, ionized calcium 2.14, cr 1.17, bun 29, calcium total serum 14.5, albumin 3.2   patient given 2 L NS and started on maintenance fluids @ 150 cc /hr  patient admitted for further management of hypercalcemia Patient with pmh of sciatica s/p L4-L5 laminectomy, hx of b/l breast implants,  hx of thyroid disease in past(was on levothyroxine which was stopped ? ), smoker , hx of psoriasis and psoriatic arthritis(recently started on biologics-first dose 9/9)   presenting after outpatient labs notable for hypercalcemia to 16. Patient recently presented to [facility] ed on august 29th where she was sent after her outpatient labs showed hypercalcemia. On that visit she was managed with fluids and told to be followed up outpatient. She returns today after her rheumatologist noted worsening calcium levels on her out patient labs with most recent calcium done yesterday elevated to 16.  Patient denies any increased thirst, urination, denies headache, fatigue, denies memory issues, lethargy, depression or anxiety. On ROS she acknowledges lower back pain and weight loss. Pt with hx of lower back pain in the past which resolved last year after  l4-l5 laminectomy. Currently has aching lower back pain ,not radiating down legs, not associated with LE weakness or numbness. Patient also acknowledges weight loss of 35 pounds since MAY when she made major lifestyle changes. Her diet only consists of green leafy vegetables' , salads and cheese and she has been working with PT /swimming over the summer. Pt with chronic constipation for many years which improved after May when she made changes to her diet. She currently denies constipation and has atleast one BM a day.   OF NOTE Pt is on vitamin D supplements -30 thousand to 40 thousand IU a day for the past 2 months which she obtains over the counter. Patient was told last year by her rheumatologist she is vitamin d deficient .   Upon arrival to ed vitals afebrile, hr 85, /84, satting well on ra   EKG with qtc 377, HR 77  Labs notable for wbc 10, hg 11.4, ionized calcium 2.14, cr 1.17, bun 29, calcium total serum 14.5, albumin 3.2   Imaging done on 30th august (ct chest abdomen with contrast ) notable for Several bilateral micronodules measuring up to 5 mm in the right upper   lobe. few nonspecific subcentimeter hepatic hypodensity   Enlarged fibroid uterus, with a somewhat atypical appearance, although nonspecific. Follow-up MRI pelvis is recommended to exclude   potential malignant degeneration. Wall thickening of the gastric antrum which may represent gastritis.  Colonic fecal retention.  Emphysema.  patient given 2 L NS and started on maintenance fluids @ 150 cc /hr  patient admitted for further management of hypercalcemia

## 2019-09-13 NOTE — H&P ADULT - PROBLEM SELECTOR PLAN 5
Patient with known hx of smoking for 45 years, currently cutting back. She is down to 3 cigarettes from almost a pack a day  no known hx of copd, asthma  imaging done on 30th august (ct chest ) with  mild centrilobular and paraseptal   emphysema. The central airways are patent. There is no pleural effusion.   Several bilateral micronodules measuring up to 5 mm in the right upper   lobe.  outpatient f/u

## 2019-09-13 NOTE — H&P ADULT - NSHPLABSRESULTS_GEN_ALL_CORE
.  LABS:                         10.3   7.98  )-----------( 212      ( 13 Sep 2019 20:38 )             30.9     09-13    142  |  107  |  26<H>  ----------------------------<  100<H>  4.0   |  28  |  1.11    Ca    15.2<HH>      13 Sep 2019 20:38  Phos  3.7     09-13  Mg     1.8     09-13    TPro  5.9<L>  /  Alb  3.5  /  TBili  <0.2  /  DBili  x   /  AST  16  /  ALT  17  /  AlkPhos  69  09-13                  < from: CT Abdomen and Pelvis w/ IV Cont (08.30.19 @ 02:32) >    Evaluation of the pulmonary parenchyma demonstrates no airspace   consolidation or mass. There is mild centrilobular and paraseptal   emphysema. The central airways are patent. There is no pleural effusion.   Several bilateral micronodules measuring up to 5 mm in the right upper   lobe.    The heart size is normal. There is no pericardial effusion. There are   mild calcification of the thoracic aorta.    There is no mediastinal, hilar, or axillary lymphadenopathy.    There are a few nonspecific subcentimeterhepatic hypodensities. The   hepatic and portal veins are patent.    No radiopaque stones are seen in the gallbladder.  The pancreas is normal   in appearance.  No splenic abnormalities are seen.    The adrenal glands are unremarkable. The kidneys are normal in   appearance. There is no hydronephrosis or perinephric stranding.      No abdominal aortic aneurysm is seen. No abdominal or lymphadenopathy is   seen.     Evaluation of the bowel demonstrates no evidence of obstruction. A normal   appendix is visualized. There is large amount stool throughout the colon.   No areas of bowel thickening identified. There is no ascites. There is   mild wall thickening of the gastric antrum.    Images of the pelvis demonstrate an enlarged fibroid uterus. A 7.5 cm   fibroid in the uterine fundus is a somewhat atypical appearance. No   adnexal lesions are seen.    Evaluation of the osseous structures demonstrates mild/moderate   degenerative changes of the spine and grade 1 anterolisthesis of L3 on   L4. No lytic or blastic osseous lesions are seen. Bilateral breast   implants.    IMPRESSION:  1.  Enlarged fibroid uterus, with a somewhat atypical appearance,   although nonspecific. Follow-up MRI pelvis is recommended to exclude   potential malignantdegeneration.  2.  Wall thickening of the gastric antrum which may represent gastritis.  3.  Colonic fecal retention.   4.  Emphysema.            < end of copied text >

## 2019-09-13 NOTE — PROVIDER CONTACT NOTE (CRITICAL VALUE NOTIFICATION) - BACKGROUND
Patient with pmh of sciatica s/p L4-L5 laminectomy, hx of b/l breast implants,  hx of thyroid disease in past(was on levothyroxine which was stopped ? ), smoker , hx of psoriasis and psoriatic arthritis(recently started on biologics-first dose 9/9)   presenting after outpatient labs notable for hypercalcemia to 16.

## 2019-09-13 NOTE — H&P ADULT - NSHPREVIEWOFSYSTEMS_GEN_ALL_CORE
Pt presenting after outpatient labs notable for hypercalcemia   )  Patient denies any increased thirst, urination, denies headache, fatigue, denies memory issues, lethargy, depression or anxiety.  Pt with  lower back pain and weight loss. Pt with hx of lower back pain in the past which resolved last year after  l4-l5 laminectomy. Currently has aching lower back pain ,not radiating down legs, not associated with LE weakness or numbness. Patient also acknowledges weight loss of 35 pounds since MAY when she made major lifestyle changes  etiology unclear however included in differential is malignancy, versus medication induced versus granulomatous disorder  highest on differential is medication induced. She is on approx 30 thousand to 40 thousand IU a day for the past 2 months which she obtains over the counter. (patient was told last year by her rheumatologist she is vitamin d deficient   Malignancy is also on differential however less likely as patient reports normal bone density on outpatient labs , if patient with MM there was would be some evidence of demineralization

## 2019-09-13 NOTE — H&P ADULT - PROBLEM SELECTOR PLAN 6
Patient with known hx of psoriasis and psoriatic arthritis , s/p single dose of biologic on 9/9 and 2 month course of topical steroids which ended last week  continue to monitor Patient reports hypothyroidism in past , was on levothyroxine which was stopped last year  obtain TSH, T3, T 4 levels

## 2019-09-13 NOTE — H&P ADULT - PROBLEM SELECTOR PLAN 2
Pt with hx of lower back pain in the past which resolved last year after  l4-l5 laminectomy. Currently has aching lower back pain ,not radiating down legs, not associated with LE weakness or numbness.  On exam no neurological deficits noted   Imaging notable for mild/moderate   degenerative changes of the spine and grade 1 anterolisthesis of L3 on   L4. No lytic or blastic osseous lesions are seen.   patient currently appears comfortable, can give naproxen prn

## 2019-09-13 NOTE — H&P ADULT - PROBLEM SELECTOR PLAN 4
Patient with known hx of smoking for 45 years, currently cutting back. She is down to 3 cigarettes from almost a pack a day  encourage cessation

## 2019-09-13 NOTE — ED PROVIDER NOTE - PHYSICAL EXAMINATION
Physical Exam  GEN: Awake, alert, non-toxic appearing, NCAT  EYES: PERRL, full EOMI,  ENT: External inspection normal, normal voice, no oropharyngeal ulcerations/lesions/swelling  HEAD: atraumatic  NECK: FROM neck, supple, no meningismus, trachea midline, no JVD  CV: rrr, no edema  RESP: cta bl, no tachypnea, no hypoxia, no resp distress,  GI: +BS, Soft, nontender, no guarding/rebound,   MSK: FROM all 4 extremities, N/V intact,   SKIN: Color normal for race, warm and dry, no rash  NEURO: Oriented x3, CN 2-12 grossly intact, normal motor, normal sensory

## 2019-09-13 NOTE — H&P ADULT - PROBLEM SELECTOR PLAN 1
Patient Pt presenting after outpatient labs notable for hypercalcemia   )  Patient denies any increased thirst, urination, denies headache, fatigue, denies memory issues, lethargy, depression or anxiety.  Pt with  lower back pain and weight loss. Pt with hx of lower back pain in the past which resolved last year after  l4-l5 laminectomy. Currently has aching lower back pain ,not radiating down legs, not associated with LE weakness or numbness. Patient also acknowledges weight loss of 35 pounds since MAY when she made major lifestyle changes  etiology unclear however included in differential is malignancy, versus medication induced versus granulomatous disorder  highest on differential is medication induced. She is on approx 30 thousand to 40 thousand IU a day for the past 2 months which she obtains over the counter. (patient was told last year by her rheumatologist she is vitamin d deficient   Malignancy is also on differential however less likely as patient reports normal bone density on outpatient labs , if patient with MM there was would be some evidence of demineralization however given hx of weight loss, hypercalcemia  and active smoker it is imperative to r/o   obtain vitamin  D leve;ls, 1,25, 25 hydroxy, Immunofixation , LDH, ACEI level, PTH, PTH related peptide  s/p 2 l NS , c/w NS @ 150 CC/HR  give pamidronate 60 mg iv stat  f/u am bmp , calcium levels Pt presenting after outpatient labs notable for hypercalcemia   )  Patient denies any increased thirst, urination, denies headache, fatigue, denies memory issues, lethargy, depression or anxiety.  Pt with  lower back pain and weight loss. Pt with hx of lower back pain in the past which resolved last year after  l4-l5 laminectomy. Currently has aching lower back pain ,not radiating down legs, not associated with LE weakness or numbness. Patient also acknowledges weight loss of 35 pounds since MAY when she made major lifestyle changes  etiology unclear however included in differential is Primary hyperparathyroidism , vs, malignancy(hx of smoking  ), versus medication induced versus granulomatous disorder  highest on differential is medication induced. She is on approx 30 thousand to 40 thousand IU a day for the past 2 months which she obtains over the counter. (patient was told last year by her rheumatologist she is vitamin d deficient   Malignancy is also on differential however less likely as patient reports normal bone density on outpatient labs , if patient with MM there was would be some evidence of demineralization however given hx of weight loss, hypercalcemia  and active smoker it is imperative to r/o other malignancies such as squamous cell ca (ct chest with no defined mass however several bilateral micronodules measuring up to 5 mm in the right upper lobe. ) versus lymphoma(less likely , no evidence of lymphadenopathy ) versus head and neck tumor   obtain vitamin  D leve;ls, 1,25, 25 hydroxy, Immunofixation , LDH, ACEI level, PTH, PTH related peptide  s/p 2 l NS , c/w NS @ 150 CC/HR  give pamidronate 60 mg iv stat  f/u am bmp , calcium levels

## 2019-09-13 NOTE — H&P ADULT - PROBLEM SELECTOR PLAN 7
DVT PPX: Lovenox daily  gi ppx: none Patient with known hx of psoriasis and psoriatic arthritis , s/p single dose of biologic on 9/9 and 2 month course of topical steroids which ended last week  continue to monitor

## 2019-09-13 NOTE — ED PROVIDER NOTE - CLINICAL SUMMARY MEDICAL DECISION MAKING FREE TEXT BOX
appears asymptomatic hypercalcemia, similar encounter in the past, ongoing outpatient workup, today 16 at PMD office, will rpt labs including ionized calcium, magnesium, ekg, disposition pending on severity of hypercalcemia

## 2019-09-13 NOTE — CONSULT NOTE ADULT - ATTENDING COMMENTS
Pt seen with Dr. Cheema this evening.  Etiology of the hypercalcemia is uncertain, but there is a strong suspicion that it is due to vitamin D toxicity--partly by her history, partly by a process of elimination:  --Given the relatively recent onset of the hypercalcemia, plus the low PTH level, she almost certainly does not have primary hyperparathyroidism.  --The acuteness and severity of the hypercalcemia would obviously first point to a malignancy, but:  The CT scan rules out any obvious malignancy in the chest or abdomen (though the nature of the abnormalities in the RUL is unclear.  Lymphoma would ordinarily be a consideration, but is far less likely given the lack of any adenopathy in the chest, abdomen or axilla  Myeloma would also be a consideration, but her report of a recent normal bone densitometry makes this unlikely--would expect to see some evidence of diffuse demineralization.  Her smoking history would raise the question of a squamous cell tumor, but she has no obvious metastatic disease, and most of the squamous cell tumors cause hypercalcemia by a humoral mechanism.   Her normal serum PO4 makes this less likely.    At this point, further diagnostic evaluation should include a PTH-RP, 25 and 1,25-D levels, ACE level (r/o sarcoid), immunofixation (myeloma), LDH level.  Should probably pursue an MRI to evaluate the "atypical" aspects of the uterine fibroid (even though I doubt that this is connected to the hypercalcemia)  Given her smoking history, should have a thorough ENT eval to rule out a squamous tumor in this area.    In terms of therapy, given the rise in her calcium level to back over 15 mg% despite IV hydration, she should optimally receive a dose of pamidronate.  Would give 60 mg IV tonight while continuing the IV hydration.  (Calcitonin might be almost as effective short-term, but would only normally be given for 3 days, and the hypercalcemia would likely promptly return.

## 2019-09-13 NOTE — ED ADULT NURSE NOTE - NS ED PATIENT SAFETY CONCERN
No Split-Thickness Skin Graft Text: The defect edges were debeveled with a #15 scalpel blade.  Given the location of the defect, shape of the defect and the proximity to free margins a split thickness skin graft was deemed most appropriate.  Using a sterile surgical marker, the primary defect shape was transferred to the donor site. The split thickness graft was then harvested.  The skin graft was then placed in the primary defect and oriented appropriately.

## 2019-09-13 NOTE — H&P ADULT - PROBLEM SELECTOR PLAN 3
Ct scan with enlarged fibroid uterus, with a somewhat atypical appearance,   although nonspecific. Follow-up MRI pelvis is recommended to exclude   potential malignant degeneration.  F/u ob gyn recs-can consider outpatient f/u Ct scan with enlarged fibroid uterus, with a somewhat atypical appearance,   although nonspecific. Follow-up MRI pelvis is recommended to exclude   potential malignant degeneration.  as per patient she has know hx of fibroids-reports a 8 cm fibroid, was told by outpatient md that it will regress after menopause so will continue to observe  pt reports menopause, has occasional spotting   f/u ob/gyn outpatient

## 2019-09-13 NOTE — ED PROVIDER NOTE - OBJECTIVE STATEMENT
54 yof pw abnormal lab results.  pt sent in by pmd for elevated calcium.  similar encounter last month.  pt f/u PMD today, and had labs drawn.  denies abd pain/nv/myalgia/confusion/urinary sx.  pt states would not have come to ED w/o the call from PMD.  recent lab work w/ PTH of 14.  has endo f/u end of October.  currently followed by PMD who's also a rheumatologist.  no diuretics.

## 2019-09-13 NOTE — ED ADULT NURSE REASSESSMENT NOTE - NS ED NURSE REASSESS COMMENT FT1
lab called with critical lab value. ionized calcium is 2.14. taken by abhi wei. dr. lorenzo made aware.

## 2019-09-13 NOTE — ED PROVIDER NOTE - PROGRESS NOTE DETAILS
d/w admitting team and icu, no indication for telemetry at the moment, agree w/ 2nd bolus, f/u 150cc/hr NS hydration, no indication for additional medications, agree w/ admission for endo workup and discharge planning Pt signed out in AM pending bed assignment for admission. IM requested consult to endo while in ED, called Endo through [facility] . Spoke to consult team's NP who will discuss case w Endo attending. Dr Carbajal spoke to katy independently and katy recommending calcitonin which is unavailable on our ED. Pt will get when at [facility]. Pt still asymptomatic. repeat ekg  QT 0.367.

## 2019-09-13 NOTE — H&P ADULT - NSHPSOCIALHISTORY_GEN_ALL_CORE
patient smokes 3 cigarettes a day -used to smoke a pack however has cut down  denies drug use  drinks a glass of wine a day

## 2019-09-13 NOTE — H&P ADULT - ASSESSMENT
Patient with pmh of sciatica s/p L4-L5 laminectomy, hx of b/l breast implants,  hx of thyroid disease in past(was on levothyroxine which was stopped ? ), smoker , hx of psoriasis and psoriatic arthritis(recently started on biologics-first dose 9/9)   presenting after outpatient labs notable for hypercalcemia , admitted for further management

## 2019-09-14 LAB
ALBUMIN SERPL ELPH-MCNC: 3.2 G/DL — LOW (ref 3.3–5)
ALP SERPL-CCNC: 73 U/L — SIGNIFICANT CHANGE UP (ref 40–120)
ALT FLD-CCNC: 18 U/L — SIGNIFICANT CHANGE UP (ref 10–45)
ANION GAP SERPL CALC-SCNC: 9 MMOL/L — SIGNIFICANT CHANGE UP (ref 5–17)
APPEARANCE UR: CLEAR — SIGNIFICANT CHANGE UP
AST SERPL-CCNC: 16 U/L — SIGNIFICANT CHANGE UP (ref 10–40)
BILIRUB SERPL-MCNC: 0.2 MG/DL — SIGNIFICANT CHANGE UP (ref 0.2–1.2)
BILIRUB UR-MCNC: NEGATIVE — SIGNIFICANT CHANGE UP
BUN SERPL-MCNC: 23 MG/DL — SIGNIFICANT CHANGE UP (ref 7–23)
CA-I BLD-SCNC: 2.1 MMOL/L — CRITICAL HIGH (ref 1.12–1.3)
CALCIUM SERPL-MCNC: 14 MG/DL — CRITICAL HIGH (ref 8.4–10.5)
CALCIUM SERPL-MCNC: 14.7 MG/DL — CRITICAL HIGH (ref 8.4–10.5)
CALCIUM SERPL-MCNC: 15.1 MG/DL — CRITICAL HIGH (ref 8.4–10.5)
CHLORIDE SERPL-SCNC: 107 MMOL/L — SIGNIFICANT CHANGE UP (ref 96–108)
CO2 SERPL-SCNC: 27 MMOL/L — SIGNIFICANT CHANGE UP (ref 22–31)
COLOR SPEC: YELLOW — SIGNIFICANT CHANGE UP
CREAT SERPL-MCNC: 1.15 MG/DL — SIGNIFICANT CHANGE UP (ref 0.5–1.3)
DIFF PNL FLD: ABNORMAL
GLUCOSE SERPL-MCNC: 96 MG/DL — SIGNIFICANT CHANGE UP (ref 70–99)
GLUCOSE UR QL: NEGATIVE — SIGNIFICANT CHANGE UP
HCT VFR BLD CALC: 35.9 % — SIGNIFICANT CHANGE UP (ref 34.5–45)
HCV AB S/CO SERPL IA: 0.08 S/CO — SIGNIFICANT CHANGE UP
HCV AB SERPL-IMP: SIGNIFICANT CHANGE UP
HGB BLD-MCNC: 11.7 G/DL — SIGNIFICANT CHANGE UP (ref 11.5–15.5)
KETONES UR-MCNC: NEGATIVE — SIGNIFICANT CHANGE UP
LEUKOCYTE ESTERASE UR-ACNC: NEGATIVE — SIGNIFICANT CHANGE UP
MAGNESIUM SERPL-MCNC: 1.7 MG/DL — SIGNIFICANT CHANGE UP (ref 1.6–2.6)
MCHC RBC-ENTMCNC: 31.1 PG — SIGNIFICANT CHANGE UP (ref 27–34)
MCHC RBC-ENTMCNC: 32.6 GM/DL — SIGNIFICANT CHANGE UP (ref 32–36)
MCV RBC AUTO: 95.5 FL — SIGNIFICANT CHANGE UP (ref 80–100)
NITRITE UR-MCNC: NEGATIVE — SIGNIFICANT CHANGE UP
NRBC # BLD: 0 /100 WBCS — SIGNIFICANT CHANGE UP (ref 0–0)
PH UR: 5.5 — SIGNIFICANT CHANGE UP (ref 5–8)
PLATELET # BLD AUTO: 256 K/UL — SIGNIFICANT CHANGE UP (ref 150–400)
POTASSIUM SERPL-MCNC: 3.7 MMOL/L — SIGNIFICANT CHANGE UP (ref 3.5–5.3)
POTASSIUM SERPL-SCNC: 3.7 MMOL/L — SIGNIFICANT CHANGE UP (ref 3.5–5.3)
PROT SERPL-MCNC: 6 G/DL — SIGNIFICANT CHANGE UP (ref 6–8.3)
PROT UR-MCNC: NEGATIVE MG/DL — SIGNIFICANT CHANGE UP
PTH-INTACT FLD-MCNC: 10 PG/ML — LOW (ref 15–65)
PTH-INTACT FLD-MCNC: 9 PG/ML — LOW (ref 15–65)
RAPID RVP RESULT: SIGNIFICANT CHANGE UP
RBC # BLD: 3.76 M/UL — LOW (ref 3.8–5.2)
RBC # FLD: 12.7 % — SIGNIFICANT CHANGE UP (ref 10.3–14.5)
SODIUM SERPL-SCNC: 143 MMOL/L — SIGNIFICANT CHANGE UP (ref 135–145)
SP GR SPEC: 1.01 — SIGNIFICANT CHANGE UP (ref 1–1.03)
T3 SERPL-MCNC: 88 NG/DL — SIGNIFICANT CHANGE UP (ref 80–200)
T4 AB SER-ACNC: 7.82 UG/DL — SIGNIFICANT CHANGE UP (ref 3.17–11.72)
T4 FREE SERPL-MCNC: 1.05 NG/DL — SIGNIFICANT CHANGE UP (ref 0.7–1.48)
TSH SERPL-MCNC: 0.75 UIU/ML — SIGNIFICANT CHANGE UP (ref 0.35–4.94)
UROBILINOGEN FLD QL: 0.2 E.U./DL — SIGNIFICANT CHANGE UP
WBC # BLD: 9.15 K/UL — SIGNIFICANT CHANGE UP (ref 3.8–10.5)
WBC # FLD AUTO: 9.15 K/UL — SIGNIFICANT CHANGE UP (ref 3.8–10.5)

## 2019-09-14 PROCEDURE — 71045 X-RAY EXAM CHEST 1 VIEW: CPT | Mod: 26

## 2019-09-14 PROCEDURE — 93010 ELECTROCARDIOGRAM REPORT: CPT

## 2019-09-14 PROCEDURE — 99233 SBSQ HOSP IP/OBS HIGH 50: CPT | Mod: GC

## 2019-09-14 RX ORDER — ACETAMINOPHEN 500 MG
650 TABLET ORAL ONCE
Refills: 0 | Status: COMPLETED | OUTPATIENT
Start: 2019-09-14 | End: 2019-09-14

## 2019-09-14 RX ORDER — LORATADINE 10 MG/1
10 TABLET ORAL ONCE
Refills: 0 | Status: COMPLETED | OUTPATIENT
Start: 2019-09-14 | End: 2019-09-14

## 2019-09-14 RX ORDER — SODIUM CHLORIDE 9 MG/ML
1500 INJECTION INTRAMUSCULAR; INTRAVENOUS; SUBCUTANEOUS ONCE
Refills: 0 | Status: COMPLETED | OUTPATIENT
Start: 2019-09-14 | End: 2019-09-14

## 2019-09-14 RX ORDER — SODIUM CHLORIDE 9 MG/ML
1000 INJECTION INTRAMUSCULAR; INTRAVENOUS; SUBCUTANEOUS
Refills: 0 | Status: DISCONTINUED | OUTPATIENT
Start: 2019-09-14 | End: 2019-09-15

## 2019-09-14 RX ADMIN — Medication 650 MILLIGRAM(S): at 17:15

## 2019-09-14 RX ADMIN — ENOXAPARIN SODIUM 40 MILLIGRAM(S): 100 INJECTION SUBCUTANEOUS at 11:51

## 2019-09-14 RX ADMIN — SODIUM CHLORIDE 200 MILLILITER(S): 9 INJECTION INTRAMUSCULAR; INTRAVENOUS; SUBCUTANEOUS at 19:07

## 2019-09-14 RX ADMIN — PAMIDRONATE DISODIUM 64.17 MILLIGRAM(S): 9 INJECTION, SOLUTION INTRAVENOUS at 00:22

## 2019-09-14 RX ADMIN — Medication 650 MILLIGRAM(S): at 21:03

## 2019-09-14 RX ADMIN — SODIUM CHLORIDE 200 MILLILITER(S): 9 INJECTION INTRAMUSCULAR; INTRAVENOUS; SUBCUTANEOUS at 11:52

## 2019-09-14 RX ADMIN — LORATADINE 10 MILLIGRAM(S): 10 TABLET ORAL at 23:54

## 2019-09-14 RX ADMIN — SODIUM CHLORIDE 150 MILLILITER(S): 9 INJECTION INTRAMUSCULAR; INTRAVENOUS; SUBCUTANEOUS at 06:35

## 2019-09-14 RX ADMIN — SODIUM CHLORIDE 1500 MILLILITER(S): 9 INJECTION INTRAMUSCULAR; INTRAVENOUS; SUBCUTANEOUS at 13:27

## 2019-09-14 RX ADMIN — Medication 650 MILLIGRAM(S): at 12:42

## 2019-09-14 RX ADMIN — Medication 650 MILLIGRAM(S): at 22:00

## 2019-09-14 RX ADMIN — SODIUM CHLORIDE 200 MILLILITER(S): 9 INJECTION INTRAMUSCULAR; INTRAVENOUS; SUBCUTANEOUS at 17:15

## 2019-09-14 NOTE — PROGRESS NOTE ADULT - PROBLEM SELECTOR PLAN 2
Pt with hx of lower back pain in the past which resolved last year after  l4-l5 laminectomy. Currently has aching lower back pain ,not radiating down legs, not associated with LE weakness or numbness.  - normal neuro exam  - Imaging notable for mild/moderate degenerative changes of the spine and grade 1 anterolisthesis of L3 on L4. No lytic or blastic osseous lesions are seen.   - naproxen prn

## 2019-09-14 NOTE — PROGRESS NOTE ADULT - PROBLEM SELECTOR PLAN 4
Patient with known hx of smoking for 45 years, currently cutting back. She is down to 3 cigarettes from almost a pack a day  - encourage cessation

## 2019-09-14 NOTE — PROGRESS NOTE ADULT - SUBJECTIVE AND OBJECTIVE BOX
OVERNIGHT EVENTS: no acute events overnight.    SUBJECTIVE / INTERVAL HPI: Patient seen and examined at bedside. Pt reports that she is doing well. Denies new symptoms; denies SOB, chest pain, abdominal pain, changes in bowel or urinary habits. Only endorses some back pain, chronic since her laminectomy.     VITAL SIGNS:  Vital Signs Last 24 Hrs  T(C): 36.7 (14 Sep 2019 05:32), Max: 37.1 (13 Sep 2019 13:31)  T(F): 98.1 (14 Sep 2019 05:32), Max: 98.7 (13 Sep 2019 13:31)  HR: 87 (14 Sep 2019 05:32) (67 - 87)  BP: 120/74 (14 Sep 2019 05:32) (120/74 - 144/85)  BP(mean): --  RR: 18 (14 Sep 2019 05:32) (17 - 20)  SpO2: 98% (14 Sep 2019 05:32) (96% - 100%)    PHYSICAL EXAM:    General: WDWN resting comfortably in bed, NAD  HEENT: NC/AT; PERRL, anicteric sclera; MMM  Neck: supple  Cardiovascular: +S1/S2; RRR  Respiratory: CTA B/L; no W/R/R, no increased work of breathing  Gastrointestinal: soft, NT/ND; +BSx4  Extremities: WWP; no edema, clubbing or cyanosis  Vascular: 2+ radial, DP/PT pulses B/L  Neurological: AAOx3; no focal deficits    MEDICATIONS:  MEDICATIONS  (STANDING):  enoxaparin Injectable 40 milliGRAM(s) SubCutaneous daily  sodium chloride 0.9%. 1000 milliLiter(s) (200 mL/Hr) IV Continuous <Continuous>    MEDICATIONS  (PRN):      ALLERGIES:  Allergies    penicillins (Unknown)    Intolerances        LABS:                        11.7   9.15  )-----------( 256      ( 14 Sep 2019 07:15 )             35.9     09-14    143  |  107  |  23  ----------------------------<  96  3.7   |  27  |  1.15    Ca    14.0<HH>      14 Sep 2019 07:12  Phos  3.7     09-13  Mg     1.7     09-14    TPro  6.0  /  Alb  3.2<L>  /  TBili  0.2  /  DBili  x   /  AST  16  /  ALT  18  /  AlkPhos  73  09-14        CAPILLARY BLOOD GLUCOSE          RADIOLOGY & ADDITIONAL TESTS: Reviewed.

## 2019-09-14 NOTE — PROGRESS NOTE ADULT - PROBLEM SELECTOR PLAN 1
Pt presenting after outpatient labs notable for hypercalcemia   - hx of lower back pain, which resolved after L4-L5 laminectomy; still residual mild pain   - weight loss of 35 pounds since May this year, when she made major lifestyle changes in her diet   - etiology unclear however included in differential is Primary hyperparathyroidism , vs, malignancy (hx of smoking), versus medication induced versus granulomatous disorder  - likely etiology is medication induced--takes 30,000-40,000 IU of vitamin D per day for the past 2 months b/c pt was told by rheumatologist that she was vitamin d deficient.  - malignancy is also on differential however less likely as patient reports normal bone density scans outpatient  - ct chest with no defined mass however several bilateral micronodules measuring up to 5 mm in the right upper lobe.  - r/o malignancy, especially with hypercalcemia in an active smoker and weight loss   - f/u vitamin  D levels, 1,25, 25 hydroxy, Immunofixation , LDH, ACEI level, PTH, PTH related peptide  - s/p 2 L NS   - will increase NS maintenance to 200cc/hr  - s/p pamidronate 60 mg IV 9/13  - f/u endocrine recs

## 2019-09-14 NOTE — PROGRESS NOTE ADULT - PROBLEM SELECTOR PLAN 3
Ct scan with enlarged fibroid uterus, with a somewhat atypical appearance,   although nonspecific. Follow-up MRI pelvis is recommended to exclude   potential malignant degeneration.  - as per patient she has know hx of fibroids-reports a 8 cm fibroid, was told by outpatient md that it will regress after menopause so will continue to observe  - f/u ob/gyn outpatient

## 2019-09-15 DIAGNOSIS — R50.9 FEVER, UNSPECIFIED: ICD-10-CM

## 2019-09-15 DIAGNOSIS — E07.9 DISORDER OF THYROID, UNSPECIFIED: ICD-10-CM

## 2019-09-15 LAB
24R-OH-CALCIDIOL SERPL-MCNC: 271.6 NG/ML — HIGH (ref 30–80)
ANION GAP SERPL CALC-SCNC: 7 MMOL/L — SIGNIFICANT CHANGE UP (ref 5–17)
ANION GAP SERPL CALC-SCNC: 8 MMOL/L — SIGNIFICANT CHANGE UP (ref 5–17)
BUN SERPL-MCNC: 28 MG/DL — HIGH (ref 7–23)
BUN SERPL-MCNC: 29 MG/DL — HIGH (ref 7–23)
CALCIUM SERPL-MCNC: 10.8 MG/DL — HIGH (ref 8.4–10.5)
CALCIUM SERPL-MCNC: 11.4 MG/DL — HIGH (ref 8.4–10.5)
CHLORIDE SERPL-SCNC: 110 MMOL/L — HIGH (ref 96–108)
CHLORIDE SERPL-SCNC: 113 MMOL/L — HIGH (ref 96–108)
CO2 SERPL-SCNC: 20 MMOL/L — LOW (ref 22–31)
CO2 SERPL-SCNC: 21 MMOL/L — LOW (ref 22–31)
CREAT SERPL-MCNC: 1.62 MG/DL — HIGH (ref 0.5–1.3)
CREAT SERPL-MCNC: 1.65 MG/DL — HIGH (ref 0.5–1.3)
GLUCOSE SERPL-MCNC: 107 MG/DL — HIGH (ref 70–99)
GLUCOSE SERPL-MCNC: 93 MG/DL — SIGNIFICANT CHANGE UP (ref 70–99)
HCT VFR BLD CALC: 27 % — LOW (ref 34.5–45)
HGB BLD-MCNC: 8.9 G/DL — LOW (ref 11.5–15.5)
MAGNESIUM SERPL-MCNC: 1.3 MG/DL — LOW (ref 1.6–2.6)
MAGNESIUM SERPL-MCNC: 1.3 MG/DL — LOW (ref 1.6–2.6)
MCHC RBC-ENTMCNC: 31 PG — SIGNIFICANT CHANGE UP (ref 27–34)
MCHC RBC-ENTMCNC: 33 GM/DL — SIGNIFICANT CHANGE UP (ref 32–36)
MCV RBC AUTO: 94.1 FL — SIGNIFICANT CHANGE UP (ref 80–100)
NRBC # BLD: 0 /100 WBCS — SIGNIFICANT CHANGE UP (ref 0–0)
PLATELET # BLD AUTO: 162 K/UL — SIGNIFICANT CHANGE UP (ref 150–400)
POTASSIUM SERPL-MCNC: 3 MMOL/L — LOW (ref 3.5–5.3)
POTASSIUM SERPL-MCNC: 3.8 MMOL/L — SIGNIFICANT CHANGE UP (ref 3.5–5.3)
POTASSIUM SERPL-SCNC: 3 MMOL/L — LOW (ref 3.5–5.3)
POTASSIUM SERPL-SCNC: 3.8 MMOL/L — SIGNIFICANT CHANGE UP (ref 3.5–5.3)
RBC # BLD: 2.87 M/UL — LOW (ref 3.8–5.2)
RBC # FLD: 13.2 % — SIGNIFICANT CHANGE UP (ref 10.3–14.5)
SODIUM SERPL-SCNC: 138 MMOL/L — SIGNIFICANT CHANGE UP (ref 135–145)
SODIUM SERPL-SCNC: 141 MMOL/L — SIGNIFICANT CHANGE UP (ref 135–145)
VIT D25+D1,25 OH+D1,25 PNL SERPL-MCNC: 256.8 PG/ML — HIGH (ref 19.9–79.3)
WBC # BLD: 7.57 K/UL — SIGNIFICANT CHANGE UP (ref 3.8–10.5)
WBC # FLD AUTO: 7.57 K/UL — SIGNIFICANT CHANGE UP (ref 3.8–10.5)

## 2019-09-15 PROCEDURE — 99233 SBSQ HOSP IP/OBS HIGH 50: CPT | Mod: GC

## 2019-09-15 RX ORDER — MAGNESIUM SULFATE 500 MG/ML
2 VIAL (ML) INJECTION ONCE
Refills: 0 | Status: COMPLETED | OUTPATIENT
Start: 2019-09-15 | End: 2019-09-15

## 2019-09-15 RX ORDER — ACETAMINOPHEN 500 MG
650 TABLET ORAL ONCE
Refills: 0 | Status: COMPLETED | OUTPATIENT
Start: 2019-09-15 | End: 2019-09-15

## 2019-09-15 RX ORDER — POTASSIUM CHLORIDE 20 MEQ
40 PACKET (EA) ORAL EVERY 4 HOURS
Refills: 0 | Status: COMPLETED | OUTPATIENT
Start: 2019-09-15 | End: 2019-09-15

## 2019-09-15 RX ORDER — SODIUM CHLORIDE 9 MG/ML
1000 INJECTION INTRAMUSCULAR; INTRAVENOUS; SUBCUTANEOUS
Refills: 0 | Status: DISCONTINUED | OUTPATIENT
Start: 2019-09-15 | End: 2019-09-16

## 2019-09-15 RX ADMIN — Medication 50 GRAM(S): at 18:09

## 2019-09-15 RX ADMIN — ENOXAPARIN SODIUM 40 MILLIGRAM(S): 100 INJECTION SUBCUTANEOUS at 11:52

## 2019-09-15 RX ADMIN — Medication 650 MILLIGRAM(S): at 23:16

## 2019-09-15 RX ADMIN — SODIUM CHLORIDE 200 MILLILITER(S): 9 INJECTION INTRAMUSCULAR; INTRAVENOUS; SUBCUTANEOUS at 12:46

## 2019-09-15 RX ADMIN — Medication 650 MILLIGRAM(S): at 05:48

## 2019-09-15 RX ADMIN — Medication 40 MILLIEQUIVALENT(S): at 09:50

## 2019-09-15 RX ADMIN — Medication 50 GRAM(S): at 09:49

## 2019-09-15 RX ADMIN — SODIUM CHLORIDE 100 MILLILITER(S): 9 INJECTION INTRAMUSCULAR; INTRAVENOUS; SUBCUTANEOUS at 19:45

## 2019-09-15 RX ADMIN — Medication 40 MILLIEQUIVALENT(S): at 13:12

## 2019-09-15 NOTE — PROGRESS NOTE ADULT - PROBLEM SELECTOR PLAN 3
Ct scan with enlarged fibroid uterus, with a somewhat atypical appearance,   although nonspecific. Follow-up MRI pelvis is recommended to exclude   potential malignant degeneration.  - as per patient she has know hx of fibroids-reports a 8 cm fibroid, was told by outpatient md that it will regress after menopause so will continue to observe  - f/u ob/gyn outpatient Pt with hx of lower back pain in the past which resolved last year after  l4-l5 laminectomy. Currently has aching lower back pain ,not radiating down legs, not associated with LE weakness or numbness.  - normal neuro exam  - Imaging notable for mild/moderate degenerative changes of the spine and grade 1 anterolisthesis of L3 on L4. No lytic or blastic osseous lesions are seen.   - naproxen prn

## 2019-09-15 NOTE — PROGRESS NOTE ADULT - PROBLEM SELECTOR PLAN 1
Pt presenting after outpatient labs notable for hypercalcemia   - hx of lower back pain, which resolved after L4-L5 laminectomy; still residual mild pain   - weight loss of 35 pounds since May this year, when she made major lifestyle changes in her diet   - etiology unclear however included in differential is Primary hyperparathyroidism , vs, malignancy (hx of smoking), versus medication induced versus granulomatous disorder  - likely etiology is medication induced--takes 30,000-40,000 IU of vitamin D per day for the past 2 months b/c pt was told by rheumatologist that she was vitamin d deficient.  - malignancy is also on differential however less likely as patient reports normal bone density scans outpatient  - ct chest with no defined mass however several bilateral micronodules measuring up to 5 mm in the right upper lobe.  - r/o malignancy, especially with hypercalcemia in an active smoker and weight loss   - f/u vitamin  D levels, 1,25, 25 hydroxy, Immunofixation , LDH, ACEI level, PTH, PTH related peptide  - s/p pamidronate 60 mg IV 9/13  - f/u endocrine recs  - c/w IVFs but would slow down the rate given complaints of periorbital edema

## 2019-09-15 NOTE — PROGRESS NOTE ADULT - SUBJECTIVE AND OBJECTIVE BOX
Progress note:    No acute events over night or over the weekend. Tolerating IV hydration well. Calcium decreasing adequately.    PMH & Surgical Hx:  Chronic lower back pain  Lumbar disc herniation with radiculopathy  Hypercalcemia  B/l breast implants 20 years ago      FH:  DM: in father  Thyroid: mother and sibling had some  Other: lymphoma in cousin    SH:  Smokin pack year smoking history - currently down to 3 cigs/day  Etoh: social drinking  Recreational Drugs: none    Current Meds:  sodium chloride 0.9%. 1000 milliLiter(s) IV Continuous <Continuous>      Allergies:  penicillins (Unknown)      ROS:  Denies the following except as indicated.    General: weight gain, decreased appetite, fatigue  Eyes: Blurry vision, double vision, visual changes  ENT: Throat pain, changes in voice,   CV: palpitations, SOB, CP, cough  GI: NVD, difficulty swallowing, abdominal pain  : polyuria, dysuria  Endo: abnormal menses, temperature intolerance, decreased libido  MSK: weakness, joint pain  Skin: diaphoresis  Heme: Easy bruising,bleeding  Neuro: HA, dizziness, lightheadedness,   Psych: Anxiety, Depression      Vital Signs Last 24 Hrs  T(C): 36.8 (15 Sep 2019 08:51), Max: 39.9 (14 Sep 2019 16:58)  T(F): 98.2 (15 Sep 2019 08:51), Max: 103.9 (14 Sep 2019 16:58)  HR: 91 (15 Sep 2019 08:51) (91 - 128)  BP: 104/60 (15 Sep 2019 08:51) (93/56 - 120/74)  BP(mean): --  RR: 18 (15 Sep 2019 08:51) (17 - 20)  SpO2: 95% (15 Sep 2019 08:51) (93% - 97%)      Constitutional: wn/wd in NAD.   HEENT: NCAT, MMM, OP clear, EOMI, , no proptosis or lid retraction  Neck: no thyromegaly or palpable thyroid nodules   Respiratory: lungs CTAB.  Cardiovascular: regular rhythm, normal S1 and S2, no audible murmurs, no peripheral edema  GI: soft, NT/ND, no masses/HSM appreciated.  Neurology: no tremors, DTR 2+  Skin: hypopigmented psoariatic skin lesion seen over the elbows and both shins  Psychiatric: AAO x 3, normal affect/mood.  Ext: radial pulses intact, DP pulses intact, extremities warm, no edema.       LABS:                        11.4   10.0  )-----------( 238      ( 13 Sep 2019 00:54 )             32.6         144  |  110<H>  |  29<H>  ----------------------------<  115<H>  3.9   |  30  |  1.17    Ca    14.5<HH>      13 Sep 2019 07:13  Phos  3.7       Mg     1.8         TPro  6.7  /  Alb  3.2<L>  /  TBili  0.1<L>  /  DBili  x   /  AST  14<L>  /  ALT  19  /  AlkPhos  90            Thyroid Stimulating Hormone, Serum: 1.640 ( @ 22:52)      RADIOLOGY & ADDITIONAL STUDIES:  CAPILLARY BLOOD GLUCOSE            A/P: his patient is a 53 YO female with medical history of psoariasis for the past 30 years, h/o hypothyroidism for past 20 years - not on any treatment, DDD pf the neck and spine with herniated disk s/p surgery got admitted with recurrent episode of hypercalcemia    1.  Hypercalcemia  - etiology unclear - possible Vit D intoxication. Rule out malignancy and granulomatous condition  - Calcium done outside was 16.4. Initially, calcium level > 15  - Labs in hospital showed ionized calcium 2.14, calcium total serum 14.5, albumin 3.2.   - CT Imaging done on  (ct chest abdomen with contrast ) notable for Several bilateral micronodules measuring up to 5 mm in the right upper lobe. few nonspecific subcentimeter hepatic hypodensity   Enlarged fibroid uterus, with a somewhat atypical appearance, although nonspecific. Follow-up MRI pelvis was recommended to exclude potential malignant degeneration. Wall thickening of the gastric antrum which may represent gastritis.   - After fluid bolus, calcium was still elevated to 15.2  - Pt received one dose of pamidronate, calcium steadily declining. continue monitoring daily.  - Continue IV fluids  - Pending PTH, PTHrP, LDH, ACEi Levels, immunofixation  - monitor calcium levels  - Consider obtaining an MRI to rule out any malignant transformation    Will continue to monitor         Pt can follow up at discharge with [facility] by calling  to make an appointment.

## 2019-09-15 NOTE — PROGRESS NOTE ADULT - SUBJECTIVE AND OBJECTIVE BOX
INTERVAL EVENTS:  -- NAEO  -- Ca downtrending  -- fever curve improving     SUBJECTIVE:  -- notes overall improvement but does report some johnson-orbital swelling since initiation of fluids  -- denies cough, frequency/urgency/dysuria, diarrhea  -- asking when she can go home     MEDICATIONS:  MEDICATIONS  (STANDING):  enoxaparin Injectable 40 milliGRAM(s) SubCutaneous daily  sodium chloride 0.9%. 1000 milliLiter(s) (200 mL/Hr) IV Continuous <Continuous>    MEDICATIONS  (PRN):    Allergies: penicillins (Unknown)    OBJECTIVE:  Vital Signs Last 24 Hrs  T(C): 36.8 (15 Sep 2019 08:51), Max: 39.9 (14 Sep 2019 16:58)  T(F): 98.2 (15 Sep 2019 08:51), Max: 103.9 (14 Sep 2019 16:58)  HR: 91 (15 Sep 2019 08:51) (91 - 128)  BP: 104/60 (15 Sep 2019 08:51) (93/56 - 120/74)  BP(mean): --  RR: 18 (15 Sep 2019 08:51) (17 - 20)  SpO2: 95% (15 Sep 2019 08:51) (93% - 97%)  I&O's Summary    14 Sep 2019 07:01  -  15 Sep 2019 07:00  --------------------------------------------------------  IN: 0 mL / OUT: 250 mL / NET: -250 mL    15 Sep 2019 07:01  -  15 Sep 2019 13:55  --------------------------------------------------------  IN: 600 mL / OUT: 0 mL / NET: 600 mL    PHYSICAL EXAM:  Gen: NAD, lying comfortably in bed, less toxic appearing today   HEENT: NCAT, +periorbital swelling, MMM, clear OP  CV: RRR, no m/g/r appreciated  Pulm: CTA B, no w/r/r; no increase in WOB  Abd: normoactive BS, soft, NTND  Ext: WWP, 2+ pulses x4; no c/e/e  Neuro: AOx3, nonfocal  Psych: pleasant, conversant and appropriate    LABS:                        8.9    7.57  )-----------( 162      ( 15 Sep 2019 07:09 )             27.0     09-15    138  |  110<H>  |  28<H>  ----------------------------<  93  3.0<L>   |  20<L>  |  1.62<H>    Ca    10.8<H>      15 Sep 2019 07:09  Mg     1.3     -15    TPro  6.0  /  Alb  3.2<L>  /  TBili  0.2  /  DBili  x   /  AST  16  /  ALT  18  /  AlkPhos  73  09-14    Urinalysis Basic - ( 14 Sep 2019 13:49 )  Color: Yellow / Appearance: Clear / S.010 / pH: x  Gluc: x / Ketone: NEGATIVE  / Bili: Negative / Urobili: 0.2 E.U./dL   Blood: x / Protein: NEGATIVE mg/dL / Nitrite: NEGATIVE   Leuk Esterase: NEGATIVE / RBC: < 5 /HPF / WBC < 5 /HPF   Sq Epi: x / Non Sq Epi: 0-5 /HPF / Bacteria: Present /HPF    MICRODATA:  Culture - Blood (collected 14 Sep 2019 17:35)  Source: .Blood Blood  Preliminary Report (15 Sep 2019 06:00):    No growth at 12 hours    Culture - Blood (collected 14 Sep 2019 17:35)  Source: .Blood Blood  Preliminary Report (15 Sep 2019 06:00):    No growth at 12 hours    RVP - negative    RADIOLOGY/OTHER STUDIES:  -- CXR (my read) - clear lungs

## 2019-09-15 NOTE — PROGRESS NOTE ADULT - PROBLEM SELECTOR PLAN 2
Pt with hx of lower back pain in the past which resolved last year after  l4-l5 laminectomy. Currently has aching lower back pain ,not radiating down legs, not associated with LE weakness or numbness.  - normal neuro exam  - Imaging notable for mild/moderate degenerative changes of the spine and grade 1 anterolisthesis of L3 on L4. No lytic or blastic osseous lesions are seen.   - naproxen prn Patient w/o localizing symptoms.  Potential side effect from Pamidronate.   - trend WBC and fever curve  - f/u outstanding microdata from 9/14's infectious work-up

## 2019-09-15 NOTE — PROGRESS NOTE ADULT - PROBLEM SELECTOR PLAN 4
Patient with known hx of smoking for 45 years, currently cutting back. She is down to 3 cigarettes from almost a pack a day  - encourage cessation Ct scan with enlarged fibroid uterus, with a somewhat atypical appearance,   although nonspecific. Follow-up MRI pelvis is recommended to exclude   potential malignant degeneration.  - as per patient she has know hx of fibroids-reports a 8 cm fibroid, was told by outpatient md that it will regress after menopause so will continue to observe  - f/u ob/gyn outpatient

## 2019-09-15 NOTE — PROGRESS NOTE ADULT - ASSESSMENT
This is a 53yo woman, former smoker, with a PMH of sciatica s/p L4-L5 laminectomy, hx of b/l breast implants, hypothyroidism (previously on Levothyroxine), psoriasis and psoriatic arthritis (ecently started on biologics-first dose 9/9) who p/w outpatient labs notable for hypercalcemia, likely 2/2 Vitamin D Toxicity, now s/p 1 dose of Pamidronate. This is a 53yo woman, former smoker, with a PMH of sciatica s/p L4-L5 laminectomy, hx of b/l breast implants, hypothyroidism (previously on Levothyroxine), psoriasis and psoriatic arthritis (ecently started on biologics-first dose 9/9) who p/w outpatient labs notable for hypercalcemia, likely 2/2 Vitamin D Toxicity, now s/p 1 dose of Pamidronate.  Floor course notable for fevers on HOD #1.

## 2019-09-16 DIAGNOSIS — R06.00 DYSPNEA, UNSPECIFIED: ICD-10-CM

## 2019-09-16 LAB
ACE SERPL-CCNC: 52 U/L — SIGNIFICANT CHANGE UP (ref 14–82)
ANION GAP SERPL CALC-SCNC: 9 MMOL/L — SIGNIFICANT CHANGE UP (ref 5–17)
BUN SERPL-MCNC: 27 MG/DL — HIGH (ref 7–23)
CALCIUM SERPL-MCNC: 10.6 MG/DL — HIGH (ref 8.4–10.5)
CHLORIDE SERPL-SCNC: 115 MMOL/L — HIGH (ref 96–108)
CO2 SERPL-SCNC: 18 MMOL/L — LOW (ref 22–31)
CREAT SERPL-MCNC: 1.48 MG/DL — HIGH (ref 0.5–1.3)
GLUCOSE SERPL-MCNC: 87 MG/DL — SIGNIFICANT CHANGE UP (ref 70–99)
HCT VFR BLD CALC: 29.4 % — LOW (ref 34.5–45)
HGB BLD-MCNC: 9.6 G/DL — LOW (ref 11.5–15.5)
INTERPRETATION SERPL IFE-IMP: SIGNIFICANT CHANGE UP
MAGNESIUM SERPL-MCNC: 1.3 MG/DL — LOW (ref 1.6–2.6)
MCHC RBC-ENTMCNC: 31.2 PG — SIGNIFICANT CHANGE UP (ref 27–34)
MCHC RBC-ENTMCNC: 32.7 GM/DL — SIGNIFICANT CHANGE UP (ref 32–36)
MCV RBC AUTO: 95.5 FL — SIGNIFICANT CHANGE UP (ref 80–100)
NRBC # BLD: 0 /100 WBCS — SIGNIFICANT CHANGE UP (ref 0–0)
PHOSPHATE SERPL-MCNC: 2.7 MG/DL — SIGNIFICANT CHANGE UP (ref 2.5–4.5)
PLATELET # BLD AUTO: 189 K/UL — SIGNIFICANT CHANGE UP (ref 150–400)
POTASSIUM SERPL-MCNC: 3.6 MMOL/L — SIGNIFICANT CHANGE UP (ref 3.5–5.3)
POTASSIUM SERPL-SCNC: 3.6 MMOL/L — SIGNIFICANT CHANGE UP (ref 3.5–5.3)
RBC # BLD: 3.08 M/UL — LOW (ref 3.8–5.2)
RBC # FLD: 13.3 % — SIGNIFICANT CHANGE UP (ref 10.3–14.5)
SODIUM SERPL-SCNC: 142 MMOL/L — SIGNIFICANT CHANGE UP (ref 135–145)
WBC # BLD: 7.43 K/UL — SIGNIFICANT CHANGE UP (ref 3.8–10.5)
WBC # FLD AUTO: 7.43 K/UL — SIGNIFICANT CHANGE UP (ref 3.8–10.5)

## 2019-09-16 PROCEDURE — 99233 SBSQ HOSP IP/OBS HIGH 50: CPT | Mod: GC

## 2019-09-16 PROCEDURE — 71045 X-RAY EXAM CHEST 1 VIEW: CPT | Mod: 26,77,76

## 2019-09-16 PROCEDURE — 99232 SBSQ HOSP IP/OBS MODERATE 35: CPT | Mod: GC

## 2019-09-16 RX ORDER — FUROSEMIDE 40 MG
20 TABLET ORAL ONCE
Refills: 0 | Status: COMPLETED | OUTPATIENT
Start: 2019-09-16 | End: 2019-09-16

## 2019-09-16 RX ORDER — ENOXAPARIN SODIUM 100 MG/ML
40 INJECTION SUBCUTANEOUS EVERY 24 HOURS
Refills: 0 | Status: DISCONTINUED | OUTPATIENT
Start: 2019-09-17 | End: 2019-09-17

## 2019-09-16 RX ORDER — MAGNESIUM SULFATE 500 MG/ML
2 VIAL (ML) INJECTION ONCE
Refills: 0 | Status: COMPLETED | OUTPATIENT
Start: 2019-09-16 | End: 2019-09-16

## 2019-09-16 RX ORDER — ACETAMINOPHEN 500 MG
650 TABLET ORAL ONCE
Refills: 0 | Status: COMPLETED | OUTPATIENT
Start: 2019-09-16 | End: 2019-09-16

## 2019-09-16 RX ORDER — POTASSIUM CHLORIDE 20 MEQ
40 PACKET (EA) ORAL ONCE
Refills: 0 | Status: COMPLETED | OUTPATIENT
Start: 2019-09-16 | End: 2019-09-16

## 2019-09-16 RX ORDER — IPRATROPIUM/ALBUTEROL SULFATE 18-103MCG
3 AEROSOL WITH ADAPTER (GRAM) INHALATION ONCE
Refills: 0 | Status: COMPLETED | OUTPATIENT
Start: 2019-09-16 | End: 2019-09-16

## 2019-09-16 RX ORDER — IPRATROPIUM/ALBUTEROL SULFATE 18-103MCG
3 AEROSOL WITH ADAPTER (GRAM) INHALATION
Refills: 0 | Status: DISCONTINUED | OUTPATIENT
Start: 2019-09-16 | End: 2019-09-17

## 2019-09-16 RX ADMIN — Medication 3 MILLILITER(S): at 07:57

## 2019-09-16 RX ADMIN — Medication 3 MILLILITER(S): at 15:58

## 2019-09-16 RX ADMIN — Medication 650 MILLIGRAM(S): at 17:30

## 2019-09-16 RX ADMIN — Medication 3 MILLILITER(S): at 21:55

## 2019-09-16 RX ADMIN — Medication 50 GRAM(S): at 07:06

## 2019-09-16 RX ADMIN — ENOXAPARIN SODIUM 40 MILLIGRAM(S): 100 INJECTION SUBCUTANEOUS at 11:23

## 2019-09-16 RX ADMIN — Medication 20 MILLIGRAM(S): at 11:00

## 2019-09-16 RX ADMIN — Medication 50 GRAM(S): at 15:58

## 2019-09-16 RX ADMIN — Medication 650 MILLIGRAM(S): at 00:16

## 2019-09-16 RX ADMIN — Medication 40 MILLIEQUIVALENT(S): at 07:06

## 2019-09-16 RX ADMIN — Medication 650 MILLIGRAM(S): at 15:22

## 2019-09-16 NOTE — PROGRESS NOTE ADULT - PROBLEM SELECTOR PLAN 1
Pt presenting after outpatient labs notable for hypercalcemia to 16, now downtrending to 10.6 today  - hx of lower back pain, which resolved after L4-L5 laminectomy; still residual mild pain   - weight loss of 35 pounds since May this year, when she made major lifestyle changes in her diet   - etiology unclear however included in differential is Primary hyperparathyroidism , vs, malignancy (hx of smoking), versus medication induced versus granulomatous disorder  - likely etiology is medication induced--takes 30,000-40,000 IU of vitamin D per day for the past 2 months b/c pt was told by rheumatologist that she was vitamin d deficient.  - malignancy is also on differential as pt with strong risk factors--smoking history as well as uterine fibroid with atypical appearance on CT  - ct chest with no defined mass however several bilateral micronodules measuring up to 5 mm in the right upper lobe.  - r/o malignancy, especially with hypercalcemia in an active smoker and weight loss   - 1, 25 vitamin D and 25 hydroxy vitamin D both markedly elevated which suggests lymphoma or granulomatous disorder; purely vitamin D overdose would cause an increase in mostly the 25 hydroxy vit D with normal 1,25 dihydroxy vitD.  - f/u Immunofixation , LDH, ACEI level, PTH, PTHrP  - will increase NS maintenance to 200cc/hr  - s/p pamidronate 60 mg IV 9/13  - f/u endocrine recs    #NENO, resolving  Pt with NENO, Cr up to 1.6 from 1.1 on admission, likely 2/2 pamidronate as this can cause elevated Cr   - now downtrending likely 2/2 vitamin D overdose vs mixed picture of vitamin D overdose and malignancy.   Pt presenting after outpatient labs notable for hypercalcemia to 16, now downtrending to 10.6 today  - hx of lower back pain, which resolved after L4-L5 laminectomy; still residual mild pain   - weight loss of 35 pounds since May this year, when she made major lifestyle changes in her diet   - etiology unclear however included in differential is Primary hyperparathyroidism , vs, malignancy (hx of smoking), versus medication induced versus granulomatous disorder  - likely etiology is medication induced--takes 30,000-40,000 IU of vitamin D per day for the past 2 months b/c pt was told by rheumatologist that she was vitamin d deficient.   - prescribed 50,000 IU of vitamin D PER WEEK, but pt not taking as prescribed  - malignancy is also on differential as pt with strong risk factors--smoking history as well as uterine fibroid with atypical appearance on CT  - ct chest with no defined mass however several bilateral micronodules measuring up to 5 mm in the right upper lobe.  - r/o malignancy, especially with hypercalcemia in an active smoker and weight loss   - 1, 25 vitamin D and 25 hydroxy vitamin D both markedly elevated which suggests lymphoma or granulomatous disorder; purely vitamin D overdose would cause an increase in mostly the 25 hydroxy vit D with normal 1,25 dihydroxy vitD.  - f/u Immunofixation, LDH, ACEI level, PTH, PTHrP  - will increase NS maintenance to 200cc/hr  - s/p pamidronate 60 mg IV 9/13  - f/u endocrine recs    #NENO, resolving  Pt with NENO, Cr up to 1.6 from 1.1 on admission, likely 2/2 pamidronate as this can cause elevated Cr   - now downtrending

## 2019-09-16 NOTE — PROGRESS NOTE ADULT - PROBLEM SELECTOR PLAN 2
Pt with new dyspnea in setting of fluid boluses--s/p 3.5L NS and maintenance fluids since admission   - CXR this AM with bilateral pleural effusions   - stopped maintenance fluids this morning   - s/p IV lasix 20mg

## 2019-09-16 NOTE — PROGRESS NOTE ADULT - ASSESSMENT
Patient with pmh of sciatica s/p L4-L5 laminectomy, hx of b/l breast implants,  hx of thyroid disease in past, 45 year smoking history, hx of psoriasis and psoriatic arthritis(recently started on biologics-first dose 9/9) presenting after outpatient labs notable for hypercalcemia , admitted for further management

## 2019-09-16 NOTE — PROGRESS NOTE ADULT - PROBLEM SELECTOR PLAN 6
Patient reports hypothyroidism in past , was on levothyroxine which was stopped last year  - clarify why it was stopped
Pt with hx of lower back pain in the past which resolved last year after  l4-l5 laminectomy. Currently has aching lower back pain ,not radiating down legs, not associated with LE weakness or numbness.  - normal neuro exam  - Imaging notable for mild/moderate degenerative changes of the spine and grade 1 anterolisthesis of L3 on L4. No lytic or blastic osseous lesions are seen.   - naproxen prn
Patient reports hypothyroidism in past , was on levothyroxine which was stopped last year  - f/u TSH, T3, T 4 levels

## 2019-09-16 NOTE — PROGRESS NOTE ADULT - PROBLEM SELECTOR PLAN 8
DVT PPX: Lovenox daily  gi ppx: none

## 2019-09-16 NOTE — PROGRESS NOTE ADULT - PROBLEM SELECTOR PLAN 7
Patient with known hx of psoriasis and psoriatic arthritis , s/p single dose of biologic on 9/9 and 2 month course of topical steroids which ended last week  continue to monitor

## 2019-09-16 NOTE — PROGRESS NOTE ADULT - PROBLEM SELECTOR PLAN 3
Pt spiked fever to 103.9F rectally on 9/14, tachycardic to 120-130s as well as subjective intense chills   - fever broke with tylenol   - afebrile since 9/14   - blood cx NGTD, UA neg, CXR without infiltrates, RVP neg  - fever may be in setting of malignancy, will need further workup, inpatient vs outpatient

## 2019-09-16 NOTE — PROGRESS NOTE ADULT - SUBJECTIVE AND OBJECTIVE BOX
INTERVAL HPI/OVERNIGHT EVENTS:    Patient is a 54y old  Female who presents with a chief complaint of hypercalcemia (16 Sep 2019 14:32)      Pt reports the following symptoms:    CONSTITUTIONAL:  Negative fever or chills, feels well, good appetite  EYES:  Negative  blurry vision or double vision  CARDIOVASCULAR:  Negative for chest pain or palpitations  RESPIRATORY:  Negative for cough, wheezing, or SOB   GASTROINTESTINAL:  Negative for nausea, vomiting, diarrhea, constipation, or abdominal pain  GENITOURINARY:  Negative frequency, urgency or dysuria  NEUROLOGIC:  No headache, confusion, dizziness, lightheadedness    MEDICATIONS  (STANDING):    MEDICATIONS  (PRN):      PHYSICAL EXAM  Vital Signs Last 24 Hrs  T(C): 36.8 (16 Sep 2019 16:36), Max: 37.3 (16 Sep 2019 15:54)  T(F): 98.2 (16 Sep 2019 16:36), Max: 99.2 (16 Sep 2019 15:54)  HR: 94 (16 Sep 2019 16:36) (93 - 101)  BP: 121/63 (16 Sep 2019 16:36) (114/63 - 126/73)  BP(mean): --  RR: 18 (16 Sep 2019 16:36) (18 - 18)  SpO2: 95% (16 Sep 2019 16:36) (93% - 98%)    Constitutional: wn/wd in NAD.   HEENT: NCAT, MMM, OP clear, EOMI, no proptosis or lid retraction  Neck: no thyromegaly or palpable thyroid nodules   Respiratory: lungs CTAB.  Cardiovascular: regular rhythm, normal S1 and S2, no audible murmurs, no peripheral edema  GI: soft, NT/ND, no masses/HSM appreciated.  Neurology: no tremors, DTR 2+  Skin: no visible rashes/lesions  Psychiatric: AAO x 3, normal affect/mood.    LABS:                        9.6    7.43  )-----------( 189      ( 16 Sep 2019 05:51 )             29.4     09-16    142  |  115<H>  |  27<H>  ----------------------------<  87  3.6   |  18<L>  |  1.48<H>    Ca    10.6<H>      16 Sep 2019 05:52  Phos  2.7     09-16  Mg     1.3     09-16          Thyroid Stimulating Hormone, Serum: 0.747 uIU/mL (09-14 @ 07:12)  Thyroid Stimulating Hormone, Serum: 1.640 uIU/mL (08-29 @ 22:52)      HbA1C:   CAPILLARY BLOOD GLUCOSE          Insulin Sliding Scale requirements X 24 Hours:    RADIOLOGY & ADDITIONAL TESTS:    A/P: 54y Female with history of DM type II presenting for       1.  DM -     Please continue           units lantus at bedtime  / in the morning and        units lispro with meals and lispro moderate / low dose sliding scale 4 times daily with meals and at bedtime.  Please continue consistent carbohydrate diet.      Goal FSG is   Will continue to monitor   For discharge, pt can continue    Pt can follow up at discharge with [facility] by calling  to make an appointment.   Will discuss case with     and update primary team INTERVAL HPI/OVERNIGHT EVENTS:    Patient seen and examined at the bedside. She became dyspneic yesterday evening. Her IVF were stopped today morning. her CXR showed pulmonary vascular congestion and was given lasix. After she received pamidronate, her calcium was steadily decreasing but still elevated. she has some headache but denied any vision changes. no nausea, vomiting, constipation, chest pain or abdominal pain . She was also given breathing treatments      Pt reports the following symptoms:    CONSTITUTIONAL:  Negative fever or chills, feels well, good appetite  EYES:  Negative  blurry vision or double vision  CARDIOVASCULAR:  Negative for chest pain or palpitations  RESPIRATORY:  Negative for cough, wheezing,  GASTROINTESTINAL:  Negative for nausea, vomiting, diarrhea, constipation, or abdominal pain  GENITOURINARY:  Negative frequency, urgency or dysuria  NEUROLOGIC:  No headache, confusion, dizziness, lightheadedness    MEDICATIONS  (STANDING):    MEDICATIONS  (PRN):      PHYSICAL EXAM  Vital Signs Last 24 Hrs  T(C): 36.8 (16 Sep 2019 16:36), Max: 37.3 (16 Sep 2019 15:54)  T(F): 98.2 (16 Sep 2019 16:36), Max: 99.2 (16 Sep 2019 15:54)  HR: 94 (16 Sep 2019 16:36) (93 - 101)  BP: 121/63 (16 Sep 2019 16:36) (114/63 - 126/73)  BP(mean): --  RR: 18 (16 Sep 2019 16:36) (18 - 18)  SpO2: 95% (16 Sep 2019 16:36) (93% - 98%)    Constitutional: wn/wd in NAD.   Respiratory: lungs - rales heard more or the right than the left  Cardiovascular: regular rhythm, normal S1 and S2, no audible murmurs, no peripheral edema  GI: soft, NT/ND, no masses/HSM appreciated.  Neurology: no tremors, no focal neurological deficits      LABS:                        9.6    7.43  )-----------( 189      ( 16 Sep 2019 05:51 )             29.4     09-16    142  |  115<H>  |  27<H>  ----------------------------<  87  3.6   |  18<L>  |  1.48<H>    Ca    10.6<H>      16 Sep 2019 05:52  Phos  2.7     09-16  Mg     1.3     09-16          Thyroid Stimulating Hormone, Serum: 0.747 uIU/mL (09-14 @ 07:12)  Thyroid Stimulating Hormone, Serum: 1.640 uIU/mL (08-29 @ 22:52)      HbA1C:   CAPILLARY BLOOD GLUCOSE          Insulin Sliding Scale requirements X 24 Hours:    RADIOLOGY & ADDITIONAL TESTS:    A/P:  his patient is a 55 YO female with medical history of psoariasis for the past 30 years, h/o hypothyroidism for past 20 years - not on any treatment, DDD pf the neck and spine with herniated disk s/p surgery got admitted with recurrent episode of hypercalcemia    1.  Hypercalcemia  - etiology unclear - likely Vit D intoxication. Rule out malignancy and granulomatous condition  - Calcium done outside was 16.4. Initially, calcium level > 15  - Labs in hospital showed ionized calcium 2.14, calcium total serum 14.5, albumin 3.2.   - CT Imaging done on 30th august (ct chest abdomen with contrast ) notable for Several bilateral micronodules measuring up to 5 mm in the right upper lobe. few nonspecific subcentimeter hepatic hypodensity   Enlarged fibroid uterus, with a somewhat atypical appearance, although nonspecific. Follow-up MRI pelvis was recommended to exclude potential malignant degeneration. Wall thickening of the gastric antrum which may represent gastritis.   - After fluid bolus, calcium was still elevated to 15.2. Pt received one dose of pamidronate on 9/13/2019, calcium steadily declining. continue monitoring daily.  - IV fluid being held given the congestion  - Labshowed 25 Vit D 271.6, 1,25 dihydroxy Vit D 256.8, iPTH 9 (reduced), TSH 0.747, free T4 1.05, Total T3 88,   - Pending PTHrP, ACEi Levels, immunofixation  - monitor calcium levels  - Consider obtaining an MRI to rule out any malignant transformation  - PLease obtain ECHO and also repeat CXR to monitor congestion  If the hypercalemia is from vit d toxicity, it can remain elevated for weeks. She needs proper monitoring of her calcium levels and further work-up to rule out granulomatous diseases given elevated 1,25 Vit D as well.    Will continue to monitor         Pt can follow up at discharge with [facility] by calling  to make an appointment.   discussed case with     and updated primary team

## 2019-09-16 NOTE — PROGRESS NOTE ADULT - PROBLEM SELECTOR PLAN 5
Patient with known hx of smoking for 45 years, currently cutting back. She is down to 3 cigarettes from almost a pack a day  - no known hx of copd, asthma  - CT chest 8/30 with  mild centrilobular and paraseptal emphysema. The central airways are patent. There is no pleural effusion. Several bilateral micronodules measuring up to 5 mm in the right upper lobe.
#emphysema  Patient with known hx of smoking for 45 years, currently cutting back. She is down to 3 cigarettes from almost a pack a day  - encourage cessation
Patient with known hx of smoking for 45 years, currently cutting back. She is down to 3 cigarettes from almost a pack a day  - no known hx of copd, asthma  - CT chest 8/30 with  mild centrilobular and paraseptal emphysema. The central airways are patent. There is no pleural effusion. Several bilateral micronodules measuring up to 5 mm in the right upper lobe.

## 2019-09-16 NOTE — PROGRESS NOTE ADULT - PROBLEM SELECTOR PLAN 9
F: 200 CC/ HR   E: REPLETE AS NEEDED  N: REGULAR  D: RMF

## 2019-09-16 NOTE — PROGRESS NOTE ADULT - SUBJECTIVE AND OBJECTIVE BOX
OVERNIGHT EVENTS: no acute overnight events     SUBJECTIVE / INTERVAL HPI: Patient seen and examined at bedside. Pt reports that she is short of breath since yesterday, feeling dyspneic. Also reports intermittent headache since the weekend. Denies fevers/chills since episode on Saturday of fever and intense chills.     VITAL SIGNS:  Vital Signs Last 24 Hrs  T(C): 36.9 (16 Sep 2019 08:38), Max: 36.9 (16 Sep 2019 05:00)  T(F): 98.4 (16 Sep 2019 08:38), Max: 98.4 (16 Sep 2019 05:00)  HR: 96 (16 Sep 2019 08:38) (93 - 101)  BP: 125/63 (16 Sep 2019 08:38) (125/63 - 134/60)  BP(mean): --  RR: 18 (16 Sep 2019 08:38) (17 - 18)  SpO2: 98% (16 Sep 2019 08:38) (93% - 99%)    PHYSICAL EXAM:    General: WDWN resting comfortably in bed, NAD  HEENT: NC/AT; PERRL, anicteric sclera; MMM  Neck: supple  Cardiovascular: +S1/S2; tachycardic, regular rate  Respiratory: crackles in lower lung bases bilaterally; no W/R/R, mild increased work of breathing  Gastrointestinal: soft, NT/ND; +BSx4  Extremities: WWP; no edema, clubbing or cyanosis  Vascular: 2+ radial, DP/PT pulses B/L  Neurological: AAOx3; no focal deficits    MEDICATIONS:  MEDICATIONS  (STANDING):  enoxaparin Injectable 40 milliGRAM(s) SubCutaneous daily    MEDICATIONS  (PRN):      ALLERGIES:  Allergies    penicillins (Unknown)    Intolerances        LABS:                        9.6    7.43  )-----------( 189      ( 16 Sep 2019 05:51 )             29.4     09-16    142  |  115<H>  |  27<H>  ----------------------------<  87  3.6   |  18<L>  |  1.48<H>    Ca    10.6<H>      16 Sep 2019 05:52  Phos  2.7     09-16  Mg     1.3     09-16          CAPILLARY BLOOD GLUCOSE          RADIOLOGY & ADDITIONAL TESTS: Reviewed.

## 2019-09-17 ENCOUNTER — TRANSCRIPTION ENCOUNTER (OUTPATIENT)
Age: 54
End: 2019-09-17

## 2019-09-17 VITALS
DIASTOLIC BLOOD PRESSURE: 75 MMHG | TEMPERATURE: 98 F | OXYGEN SATURATION: 96 % | SYSTOLIC BLOOD PRESSURE: 118 MMHG | RESPIRATION RATE: 18 BRPM | HEART RATE: 78 BPM

## 2019-09-17 LAB
ANION GAP SERPL CALC-SCNC: 10 MMOL/L — SIGNIFICANT CHANGE UP (ref 5–17)
BUN SERPL-MCNC: 19 MG/DL — SIGNIFICANT CHANGE UP (ref 7–23)
CALCIUM SERPL-MCNC: 10.4 MG/DL — SIGNIFICANT CHANGE UP (ref 8.4–10.5)
CHLORIDE SERPL-SCNC: 110 MMOL/L — HIGH (ref 96–108)
CO2 SERPL-SCNC: 21 MMOL/L — LOW (ref 22–31)
CREAT SERPL-MCNC: 1.21 MG/DL — SIGNIFICANT CHANGE UP (ref 0.5–1.3)
GLUCOSE SERPL-MCNC: 108 MG/DL — HIGH (ref 70–99)
HCT VFR BLD CALC: 27.1 % — LOW (ref 34.5–45)
HGB BLD-MCNC: 9.1 G/DL — LOW (ref 11.5–15.5)
MAGNESIUM SERPL-MCNC: 1.5 MG/DL — LOW (ref 1.6–2.6)
MCHC RBC-ENTMCNC: 30.8 PG — SIGNIFICANT CHANGE UP (ref 27–34)
MCHC RBC-ENTMCNC: 33.6 GM/DL — SIGNIFICANT CHANGE UP (ref 32–36)
MCV RBC AUTO: 91.9 FL — SIGNIFICANT CHANGE UP (ref 80–100)
NRBC # BLD: 0 /100 WBCS — SIGNIFICANT CHANGE UP (ref 0–0)
PLATELET # BLD AUTO: 191 K/UL — SIGNIFICANT CHANGE UP (ref 150–400)
POTASSIUM SERPL-MCNC: 3 MMOL/L — LOW (ref 3.5–5.3)
POTASSIUM SERPL-SCNC: 3 MMOL/L — LOW (ref 3.5–5.3)
RBC # BLD: 2.95 M/UL — LOW (ref 3.8–5.2)
RBC # FLD: 13.1 % — SIGNIFICANT CHANGE UP (ref 10.3–14.5)
SODIUM SERPL-SCNC: 141 MMOL/L — SIGNIFICANT CHANGE UP (ref 135–145)
WBC # BLD: 6.35 K/UL — SIGNIFICANT CHANGE UP (ref 3.8–10.5)
WBC # FLD AUTO: 6.35 K/UL — SIGNIFICANT CHANGE UP (ref 3.8–10.5)

## 2019-09-17 PROCEDURE — 86803 HEPATITIS C AB TEST: CPT

## 2019-09-17 PROCEDURE — 87581 M.PNEUMON DNA AMP PROBE: CPT

## 2019-09-17 PROCEDURE — 86334 IMMUNOFIX E-PHORESIS SERUM: CPT

## 2019-09-17 PROCEDURE — 87486 CHLMYD PNEUM DNA AMP PROBE: CPT

## 2019-09-17 PROCEDURE — 87040 BLOOD CULTURE FOR BACTERIA: CPT

## 2019-09-17 PROCEDURE — 84443 ASSAY THYROID STIM HORMONE: CPT

## 2019-09-17 PROCEDURE — 96361 HYDRATE IV INFUSION ADD-ON: CPT

## 2019-09-17 PROCEDURE — 96360 HYDRATION IV INFUSION INIT: CPT

## 2019-09-17 PROCEDURE — 87798 DETECT AGENT NOS DNA AMP: CPT

## 2019-09-17 PROCEDURE — 36415 COLL VENOUS BLD VENIPUNCTURE: CPT

## 2019-09-17 PROCEDURE — 99285 EMERGENCY DEPT VISIT HI MDM: CPT | Mod: 25

## 2019-09-17 PROCEDURE — 84100 ASSAY OF PHOSPHORUS: CPT

## 2019-09-17 PROCEDURE — 84439 ASSAY OF FREE THYROXINE: CPT

## 2019-09-17 PROCEDURE — 85027 COMPLETE CBC AUTOMATED: CPT

## 2019-09-17 PROCEDURE — 83615 LACTATE (LD) (LDH) ENZYME: CPT

## 2019-09-17 PROCEDURE — 84300 ASSAY OF URINE SODIUM: CPT

## 2019-09-17 PROCEDURE — 93306 TTE W/DOPPLER COMPLETE: CPT

## 2019-09-17 PROCEDURE — 82306 VITAMIN D 25 HYDROXY: CPT

## 2019-09-17 PROCEDURE — 80048 BASIC METABOLIC PNL TOTAL CA: CPT

## 2019-09-17 PROCEDURE — 84436 ASSAY OF TOTAL THYROXINE: CPT

## 2019-09-17 PROCEDURE — 99239 HOSP IP/OBS DSCHRG MGMT >30: CPT | Mod: GC

## 2019-09-17 PROCEDURE — 99232 SBSQ HOSP IP/OBS MODERATE 35: CPT | Mod: GC

## 2019-09-17 PROCEDURE — 93306 TTE W/DOPPLER COMPLETE: CPT | Mod: 26

## 2019-09-17 PROCEDURE — 82652 VIT D 1 25-DIHYDROXY: CPT

## 2019-09-17 PROCEDURE — 82164 ANGIOTENSIN I ENZYME TEST: CPT

## 2019-09-17 PROCEDURE — 87633 RESP VIRUS 12-25 TARGETS: CPT

## 2019-09-17 PROCEDURE — 82310 ASSAY OF CALCIUM: CPT

## 2019-09-17 PROCEDURE — 83970 ASSAY OF PARATHORMONE: CPT

## 2019-09-17 PROCEDURE — 94640 AIRWAY INHALATION TREATMENT: CPT

## 2019-09-17 PROCEDURE — 82330 ASSAY OF CALCIUM: CPT

## 2019-09-17 PROCEDURE — 93005 ELECTROCARDIOGRAM TRACING: CPT | Mod: 76

## 2019-09-17 PROCEDURE — 80053 COMPREHEN METABOLIC PANEL: CPT

## 2019-09-17 PROCEDURE — 83735 ASSAY OF MAGNESIUM: CPT

## 2019-09-17 PROCEDURE — 83519 RIA NONANTIBODY: CPT

## 2019-09-17 PROCEDURE — 84480 ASSAY TRIIODOTHYRONINE (T3): CPT

## 2019-09-17 PROCEDURE — 81001 URINALYSIS AUTO W/SCOPE: CPT

## 2019-09-17 PROCEDURE — 71045 X-RAY EXAM CHEST 1 VIEW: CPT

## 2019-09-17 PROCEDURE — 82570 ASSAY OF URINE CREATININE: CPT

## 2019-09-17 RX ORDER — POTASSIUM CHLORIDE 20 MEQ
40 PACKET (EA) ORAL ONCE
Refills: 0 | Status: COMPLETED | OUTPATIENT
Start: 2019-09-17 | End: 2019-09-17

## 2019-09-17 RX ORDER — CHOLECALCIFEROL (VITAMIN D3) 125 MCG
1 CAPSULE ORAL
Qty: 0 | Refills: 0 | DISCHARGE

## 2019-09-17 RX ORDER — MAGNESIUM SULFATE 500 MG/ML
2 VIAL (ML) INJECTION
Refills: 0 | Status: COMPLETED | OUTPATIENT
Start: 2019-09-17 | End: 2019-09-17

## 2019-09-17 RX ORDER — FUROSEMIDE 40 MG
20 TABLET ORAL ONCE
Refills: 0 | Status: COMPLETED | OUTPATIENT
Start: 2019-09-17 | End: 2019-09-17

## 2019-09-17 RX ORDER — MAGNESIUM OXIDE 400 MG ORAL TABLET 241.3 MG
400 TABLET ORAL ONCE
Refills: 0 | Status: COMPLETED | OUTPATIENT
Start: 2019-09-17 | End: 2019-09-17

## 2019-09-17 RX ADMIN — Medication 40 MILLIEQUIVALENT(S): at 10:10

## 2019-09-17 RX ADMIN — ENOXAPARIN SODIUM 40 MILLIGRAM(S): 100 INJECTION SUBCUTANEOUS at 13:07

## 2019-09-17 RX ADMIN — Medication 50 GRAM(S): at 10:08

## 2019-09-17 RX ADMIN — Medication 20 MILLIGRAM(S): at 10:09

## 2019-09-17 RX ADMIN — Medication 40 MILLIEQUIVALENT(S): at 13:07

## 2019-09-17 RX ADMIN — MAGNESIUM OXIDE 400 MG ORAL TABLET 400 MILLIGRAM(S): 241.3 TABLET ORAL at 16:21

## 2019-09-17 NOTE — PROGRESS NOTE ADULT - SUBJECTIVE AND OBJECTIVE BOX
INTERVAL HPI/OVERNIGHT EVENTS:    Patient is a 54y old  Female who presents with a chief complaint of hypercalcemia (17 Sep 2019 11:34)      Pt reports the following symptoms:    CONSTITUTIONAL:  Negative fever or chills, feels well, good appetite  EYES:  Negative  blurry vision or double vision  CARDIOVASCULAR:  Negative for chest pain or palpitations  RESPIRATORY:  Negative for cough, wheezing, or SOB   GASTROINTESTINAL:  Negative for nausea, vomiting, diarrhea, constipation, or abdominal pain  GENITOURINARY:  Negative frequency, urgency or dysuria  NEUROLOGIC:  No headache, confusion, dizziness, lightheadedness    MEDICATIONS  (STANDING):  enoxaparin Injectable 40 milliGRAM(s) SubCutaneous every 24 hours  magnesium sulfate  IVPB 2 Gram(s) IV Intermittent every 2 hours    MEDICATIONS  (PRN):  ALBUTerol/ipratropium for Nebulization. 3 milliLiter(s) Nebulizer four times a day PRN Shortness of Breath and/or Wheezing      PHYSICAL EXAM  Vital Signs Last 24 Hrs  T(C): 36.7 (17 Sep 2019 09:42), Max: 37.5 (17 Sep 2019 05:58)  T(F): 98 (17 Sep 2019 09:42), Max: 99.5 (17 Sep 2019 05:58)  HR: 90 (17 Sep 2019 09:42) (90 - 98)  BP: 119/68 (17 Sep 2019 09:42) (114/63 - 137/80)  BP(mean): --  RR: 18 (17 Sep 2019 05:58) (18 - 18)  SpO2: 94% (17 Sep 2019 09:42) (90% - 96%)    Constitutional: wn/wd in NAD.   HEENT: NCAT, MMM, OP clear, EOMI, no proptosis or lid retraction  Neck: no thyromegaly or palpable thyroid nodules   Respiratory: lungs CTAB.  Cardiovascular: regular rhythm, normal S1 and S2, no audible murmurs, no peripheral edema  GI: soft, NT/ND, no masses/HSM appreciated.  Neurology: no tremors, DTR 2+  Skin: no visible rashes/lesions  Psychiatric: AAO x 3, normal affect/mood.    LABS:                        9.1    6.35  )-----------( 191      ( 17 Sep 2019 05:47 )             27.1     09-17    141  |  110<H>  |  19  ----------------------------<  108<H>  3.0<L>   |  21<L>  |  1.21    Ca    10.4      17 Sep 2019 05:47  Phos  2.7     09-16  Mg     1.5     09-17          Thyroid Stimulating Hormone, Serum: 0.747 uIU/mL (09-14 @ 07:12)  Thyroid Stimulating Hormone, Serum: 1.640 uIU/mL (08-29 @ 22:52)      HbA1C:   CAPILLARY BLOOD GLUCOSE          Insulin Sliding Scale requirements X 24 Hours:    RADIOLOGY & ADDITIONAL TESTS:    A/P: 54y Female with history of DM type II presenting for       1.  DM -     Please continue           units lantus at bedtime  / in the morning and        units lispro with meals and lispro moderate / low dose sliding scale 4 times daily with meals and at bedtime.  Please continue consistent carbohydrate diet.      Goal FSG is   Will continue to monitor   For discharge, pt can continue    Pt can follow up at discharge with [facility] by calling  to make an appointment.   Will discuss case with     and update primary team INTERVAL HPI/OVERNIGHT EVENTS:    Patient seen and examined at the bedside. No acute events overnight. She received lasix 20mg IV - 2 doses and had diuresis of about 1.5Liters. Her dyspnea was much better today. her labs today showed calcium 10.4 with albumin 3.2 and corrected calcium was 11, Mg 1.5, Phos 2.7, potassium 3.0. ECHO pending. She denies any breast pain, chest pain or any abdominal pain or constipation. More into her information, She had her mammogram done 2 years outside [facility].       Pt reports the following symptoms:    CONSTITUTIONAL:  Negative fever or chills, feels well, good appetite  EYES:  Negative  blurry vision or double vision  CARDIOVASCULAR:  Negative for chest pain or palpitations  RESPIRATORY:  Negative for cough, wheezing, or SOB   GASTROINTESTINAL:  Negative for nausea, vomiting, diarrhea, constipation, or abdominal pain  GENITOURINARY:  Negative frequency, urgency or dysuria  NEUROLOGIC:  No headache, confusion, dizziness, lightheadedness    MEDICATIONS  (STANDING):  enoxaparin Injectable 40 milliGRAM(s) SubCutaneous every 24 hours  magnesium sulfate  IVPB 2 Gram(s) IV Intermittent every 2 hours    MEDICATIONS  (PRN):  ALBUTerol/ipratropium for Nebulization. 3 milliLiter(s) Nebulizer four times a day PRN Shortness of Breath and/or Wheezing      PHYSICAL EXAM  Vital Signs Last 24 Hrs  T(C): 36.7 (17 Sep 2019 09:42), Max: 37.5 (17 Sep 2019 05:58)  T(F): 98 (17 Sep 2019 09:42), Max: 99.5 (17 Sep 2019 05:58)  HR: 90 (17 Sep 2019 09:42) (90 - 98)  BP: 119/68 (17 Sep 2019 09:42) (114/63 - 137/80)  BP(mean): --  RR: 18 (17 Sep 2019 05:58) (18 - 18)  SpO2: 94% (17 Sep 2019 09:42) (90% - 96%)    Constitutional: wn/wd in NAD.   Respiratory: lungs CTAB.  Cardiovascular: regular rhythm, normal S1 and S2, no audible murmurs, no peripheral edema  GI: soft, NT/ND, no masses/HSM appreciated.  Neurology: no tremors, DTR 2+  Skin: no visible rashes/lesions  Psychiatric: AAO x 3, normal affect/mood.    LABS:                        9.1    6.35  )-----------( 191      ( 17 Sep 2019 05:47 )             27.1     09-17    141  |  110<H>  |  19  ----------------------------<  108<H>  3.0<L>   |  21<L>  |  1.21    Ca    10.4      17 Sep 2019 05:47  Phos  2.7     09-16  Mg     1.5     09-17          Thyroid Stimulating Hormone, Serum: 0.747 uIU/mL (09-14 @ 07:12)  Thyroid Stimulating Hormone, Serum: 1.640 uIU/mL (08-29 @ 22:52)      HbA1C:   CAPILLARY BLOOD GLUCOSE          Insulin Sliding Scale requirements X 24 Hours:    RADIOLOGY & ADDITIONAL TESTS:    A/P: his patient is a 55 YO female with medical history of psoariasis for the past 30 years, h/o hypothyroidism for past 20 years - not on any treatment, DDD pf the neck and spine with herniated disk s/p surgery got admitted with recurrent episode of hypercalcemia    1.  Hypercalcemia  - etiology unclear - likely Vit D intoxication. Rule out malignancy and granulomatous condition  - Calcium done outside was 16.4. Initially, calcium level > 15  - Labs in hospital showed ionized calcium 2.14, calcium total serum 14.5, albumin 3.2.   - CT Imaging done on 30th august (ct chest abdomen with contrast ) notable for Several bilateral micronodules measuring up to 5 mm in the right upper lobe. few nonspecific subcentimeter hepatic hypodensity   Enlarged fibroid uterus, with a somewhat atypical appearance, although nonspecific. Follow-up MRI pelvis was recommended to exclude potential malignant degeneration. Wall thickening of the gastric antrum which may represent gastritis.   - After fluid bolus, calcium was still elevated to 15.2. Pt received one dose of pamidronate on 9/13/2019, calcium steadily declining. continue monitoring daily.  - IV fluid being held given the congestion  - Labshowed 25 Vit D 271.6, 1,25 dihydroxy Vit D 256.8, iPTH 9 (reduced), TSH 0.747, free T4 1.05, Total T3 88,   - Pending PTHrP, ACEi Levels, immunofixation  - monitor calcium levels  - Consider obtaining an MRI to rule out any malignant transformation  - PLease obtain ECHO and also repeat CXR to monitor congestion  If the hypercalemia is from vit d toxicity, it can remain elevated for weeks. She needs proper monitoring of her calcium levels and further work-up to rule out granulomatous diseases given elevated 1,25 Vit D as well.    Will continue to monitor     Pt can follow up at discharge with [facility] by calling  to make an appointment.   discussed case with     and updated primary team INTERVAL HPI/OVERNIGHT EVENTS:    Patient seen and examined at the bedside. No acute events overnight. She received lasix 20mg IV - 2 doses and had diuresis of about 1.5Liters. Her dyspnea was much better today. her labs today showed calcium 10.4 with albumin 3.2 and corrected calcium was 11, Mg 1.5, Phos 2.7, potassium 3.0. ECHO pending. She denies any breast pain, chest pain or any abdominal pain or constipation. More into her information, She had her mammogram done 2 years outside [facility]. She said that she went to [place] and has been doing some sun bathing on and off during summer times. Patient is being planned for discharge today.    Pt reports the following symptoms:    CONSTITUTIONAL:  Negative fever or chills, feels well, good appetite  EYES:  Negative  blurry vision or double vision  CARDIOVASCULAR:  Negative for chest pain or palpitations  RESPIRATORY:  Negative for cough, wheezing, or SOB   GASTROINTESTINAL:  Negative for nausea, vomiting, diarrhea, constipation, or abdominal pain  GENITOURINARY:  Negative frequency, urgency or dysuria  NEUROLOGIC:  No headache, confusion, dizziness, lightheadedness    MEDICATIONS  (STANDING):  enoxaparin Injectable 40 milliGRAM(s) SubCutaneous every 24 hours  magnesium sulfate  IVPB 2 Gram(s) IV Intermittent every 2 hours    MEDICATIONS  (PRN):  ALBUTerol/ipratropium for Nebulization. 3 milliLiter(s) Nebulizer four times a day PRN Shortness of Breath and/or Wheezing      PHYSICAL EXAM  Vital Signs Last 24 Hrs  T(C): 36.7 (17 Sep 2019 09:42), Max: 37.5 (17 Sep 2019 05:58)  T(F): 98 (17 Sep 2019 09:42), Max: 99.5 (17 Sep 2019 05:58)  HR: 90 (17 Sep 2019 09:42) (90 - 98)  BP: 119/68 (17 Sep 2019 09:42) (114/63 - 137/80)  BP(mean): --  RR: 18 (17 Sep 2019 05:58) (18 - 18)  SpO2: 94% (17 Sep 2019 09:42) (90% - 96%)    Constitutional: wn/wd in NAD.   Respiratory: lungs CTAB.  Cardiovascular: regular rhythm, normal S1 and S2, no audible murmurs, no peripheral edema  GI: soft, NT/ND, no masses/HSM appreciated.  Neurology: no tremors, no focal neurological deficits      LABS:                        9.1    6.35  )-----------( 191      ( 17 Sep 2019 05:47 )             27.1     09-17    141  |  110<H>  |  19  ----------------------------<  108<H>  3.0<L>   |  21<L>  |  1.21    Ca    10.4      17 Sep 2019 05:47  Phos  2.7     09-16  Mg     1.5     09-17          Thyroid Stimulating Hormone, Serum: 0.747 uIU/mL (09-14 @ 07:12)  Thyroid Stimulating Hormone, Serum: 1.640 uIU/mL (08-29 @ 22:52)      HbA1C:   CAPILLARY BLOOD GLUCOSE          Insulin Sliding Scale requirements X 24 Hours:    RADIOLOGY & ADDITIONAL TESTS:    A/P: his patient is a 53 YO female with medical history of psoariasis for the past 30 years, h/o hypothyroidism for past 20 years - not on any treatment, DDD pf the neck and spine with herniated disk s/p surgery got admitted with recurrent episode of hypercalcemia    1.  Hypercalcemia  - etiology unclear - likely Vit D intoxication. Rule out malignancy and granulomatous condition like sarcoidosis  - On admission, Calcium done outside was 16.4. Initially, calcium level > 15  - Labs in hospital showed ionized calcium 2.14, calcium total serum 14.5, albumin 3.2.   - CT Imaging done on 30th august (ct chest abdomen with contrast ) notable for Several bilateral micronodules measuring up to 5 mm in the right upper lobe. few nonspecific subcentimeter hepatic hypodensity   Enlarged fibroid uterus, with a somewhat atypical appearance, although nonspecific. Follow-up MRI pelvis was recommended to exclude potential malignant degeneration. Wall thickening of the gastric antrum which may represent gastritis.   - After fluid bolus, calcium was still elevated to 15.2. Pt received one dose of pamidronate on 9/13/2019, calcium steadily declining. continue monitoring daily.  - IV fluid being held given the congestion yesterday  - Labs showed 25 Vit D 271.6, 1,25 dihydroxy Vit D 256.8, iPTH 9 (reduced), TSH 0.747, free T4 1.05, Total T3 88, , ACEi level was 52, immunofixation did not show any monoclonal bands.   - Pending PTHrP  - today, calcium level trending down.   If the hypercalemia is from vit d toxicity, it can remain elevated for weeks. She needs proper monitoring of her calcium levels and further work-up to rule out granulomatous diseases given elevated 1,25 Vit D as well. With  h/o breast implants in the past, she may need workup as well to rule out granulomatous lesions.  Spoke to her rheumatologist  (Contact [phone]) about the work-up done so far and the possible D/D.  Primary team to fax over her records to  office (fax [phone])  Because of her insurance issues, she cannot follow up with us in the clinic. She si going to follow up with  who is going to establish care for her in [facility].   discussed case with     and updated primary team

## 2019-09-17 NOTE — PROGRESS NOTE ADULT - NSHPATTENDINGPLANDISCUSS_GEN_ALL_CORE
Dr. Cheema and [address] [place], [place] [address]taff
Dr. Cheema, patient and her friend, and [address] [place], [place] [address]taff
resident team and patient.
resident team and patient.

## 2019-09-17 NOTE — DISCHARGE NOTE PROVIDER - NSDCCPCAREPLAN_GEN_ALL_CORE_FT
PRINCIPAL DISCHARGE DIAGNOSIS  Diagnosis: Hypercalcemia  Assessment and Plan of Treatment: You were admitted to the hospital because you were found to have a very high calcium of 16. You received a medication called pamidronate in the ED, which decreases the calcium. After a couple of days, your calcium came down to 10.4, which is within the normal range. You also were given a lot of fluids to control your calcium, after which you became short of breath. This could be normal or there could be a problem with your heart. You received an ultrasound of your heart, called an echocardiogram to evaluate if your heart pumping function was normal. It is very important that you follow up with your primary care doctor, Dr. Storey as well as with your ob/gyn to further work up the reason behind your high calcium. You had some micronodules in your lungs as well as an atypical appearing fibroid in your uterus, which you will need to see your Ob/Gyn for--we recommend you get an MRI to further characterize the appearance of this. Other causes of high calcium are granulomatous diseases such as sarcoidosis or malignancy. Thus, it is important for you to follow up with your primary care doctor.      SECONDARY DISCHARGE DIAGNOSES  Diagnosis: Imaging abnormality  Assessment and Plan of Treatment: You had some micronodules in the right upper lobe of your CT chest scan. Because you are a long time smoker, you are at risk for lung malignancies/pathologies in general. You will need to have close follow up to monitor these changes in your CT scan. You also had fibroids in your uterus, which you know about. However, you will need to follow up with your ob/gyn for these fibroids because they had a slightly atypical appearance, for which your ob/gyn may want to order an MRI to further work this up.    Diagnosis: Dyspnea  Assessment and Plan of Treatment: You became short of breath because we gave you so much fluids and it went into your lungs. This can happen normally in healthy patients or it can happen when patients have heart failure. You got an echocardiogram, an ultrasound of your heart to evaluate your heart pumping function. Please have your primary care doctor also follow this up. PRINCIPAL DISCHARGE DIAGNOSIS  Diagnosis: Hypercalcemia  Assessment and Plan of Treatment: You were admitted to the hospital because you were found to have a very high calcium of 16. You received a medication called pamidronate in the ED, which decreases the calcium. After a couple of days, your calcium came down to 10.4, which is within the normal range. You also were given a lot of fluids to control your calcium, after which you became short of breath. This could be normal or there could be a problem with your heart. Your primary care doctor may want to do an ultrasound of your heart to evaluate your heart pumping function better. It is very important that you follow up with your primary care doctor, Dr. Storey as well as with your ob/gyn to further work up the reason behind your high calcium. You had some micronodules in your lungs as well as an atypical appearing fibroid in your uterus, which you will need to see your Ob/Gyn for--we recommend you get an MRI to further characterize the appearance of this. Other causes of high calcium are granulomatous diseases such as sarcoidosis or malignancy. Thus, it is important for you to follow up with your primary care doctor and an endocrinologist      SECONDARY DISCHARGE DIAGNOSES  Diagnosis: Imaging abnormality  Assessment and Plan of Treatment: You had some micronodules in the right upper lobe of your CT chest scan. Because you are a long time smoker, you are at risk for lung malignancies/pathologies in general. You will need to have close follow up to monitor these changes in your CT scan. You also had fibroids in your uterus, which you know about. However, you will need to follow up with your ob/gyn for these fibroids because they had a slightly atypical appearance, for which your ob/gyn may want to order an MRI to further work this up.    Diagnosis: Dyspnea  Assessment and Plan of Treatment: You became short of breath because we gave you so much fluids and it went into your lungs. This can happen normally in healthy patients or it can happen when patients have heart failure. Your primary care doctor may want to get an ultrasound of your heart to evaluate your heart pumping function.

## 2019-09-17 NOTE — DISCHARGE NOTE NURSING/CASE MANAGEMENT/SOCIAL WORK - PATIENT PORTAL LINK FT
You can access the FollowMyHealth Patient Portal offered by [facility] by registering at the following website: [email or web address]. By joining [employer]’s FollowMyHealth portal, you will also be able to view your health information using other applications (apps) compatible with our system.

## 2019-09-17 NOTE — DISCHARGE NOTE PROVIDER - HOSPITAL COURSE
Patient with pmh of sciatica s/p L4-L5 laminectomy, hx of b/l breast implants,  hx of thyroid disease in past, 45 year smoking history, hx of psoriasis and psoriatic arthritis (recently started on biologics-first dose 9/9) presenting after outpatient labs notable for hypercalcemia, admitted for further management. Pt was managed with 2L fluid bolus in the ED and started on maintenance fluids while inpatient as well as given IV pamidronate 60mg once in the ED. While inpatient pt was continued on maintenance fluids NS @150cc/hr, increased to 200cc/hr. Her calcium came down to 10.4 on time of discharge. On day 2 of admission, pt was experiencing chills and was febrile to 103.9F rectally. Pt was given tylenol, given 1.5L of fluids and blood cultures were collected. Blood cultures were negative x2, CXR clear, and U/A neg. Next day, patient             Problem List/Main Diagnoses (system-based):         Inpatient treatment course:         #hypercalcemia     Pt with hypercalcemia to 16 on admission. Likely mixed picture of vitamin D overdose and possible malignancy, given risk factors and history, as well as labs. Etiology likely gramulomatous disorder vs lymphoma.    - s/p IV pamidronate 60mg in the ED     - s/p 3.5L NS bolus and maintenance fluids     - on Day 3, patient with SOB--CXR with pulmonary effusions bilaterally, so d/c'ed fluids     - obtained echo before discharge --will need to follow up     - low PTH--9-10    - 1, 25 dihydroxy vitamin D -- 256.8, 25 hydroxy vitamin D: 271.6     - TSH 0.747, free T4 1.05, Total T3 88,     - both vitamin D elevated, which would be more consistent with granulomatous disorder or lymphoma.     - will need to follow up ACEi, immunofixation assay and PTHrP         #NENO     - Cr bumped to 1.6 from 1.1 on admission, likely 2/2 pamidronate as this can cause elevated Cr     - downtrended at discharge 1.21         #dyspnea     - pt became short of breath on day 3 of admission, CXR found to have fluid in lungs and facial swelling, no pitting edema     - no known history of heart failure, evaluate with echo     - shortness of breath resolved with lasix and duonebs x3    - s/p IV lasix 20mg x2, stopped maintenance fluids        #CT chest imaging abnormality     CT chest with no defined mass however several bilateral micronodules measuring up to 5 mm in the right upper lobe     - will require further workup         #CT abdomen imaging abnormality     - enlarged fibroid uterus, with a somewhat atypical appearance, although nonspecific.     - follow-up MRI pelvis is recommended to exclude potential malignant degeneration.    - follow up with ob/gyn outpatient and MRI outpatient         New medications:     - STOP vitamin D         Labs to be followed outpatient:     CBC, BMP (Ca), PTHrP, ACEi, immunofixation assay        Exam to be followed outpatient:     lungs, genitourinary exam        Other:    MRI abdomen, CT chest Patient with pmh of sciatica s/p L4-L5 laminectomy, hx of b/l breast implants,  hx of thyroid disease in past, 45 year smoking history, hx of psoriasis and psoriatic arthritis (recently started on biologics-first dose 9/9) presenting after outpatient labs notable for hypercalcemia, admitted for further management. Pt was managed with 2L fluid bolus in the ED and started on maintenance fluids while inpatient as well as given IV pamidronate 60mg once in the ED. While inpatient pt was continued on maintenance fluids NS @150cc/hr, increased to 200cc/hr. Her calcium came down to 10.4 on time of discharge. On day 2 of admission, pt was experiencing chills and was febrile to 103.9F rectally. Pt was given tylenol, given 1.5L of fluids and blood cultures were collected. Blood cultures were negative x2, CXR clear, and U/A neg. Next day, patient             Problem List/Main Diagnoses (system-based):         Inpatient treatment course:         #hypercalcemia     Pt with hypercalcemia to 16 on admission. Likely mixed picture of vitamin D overdose and possible malignancy, given risk factors and history, as well as labs. Etiology likely gramulomatous disorder vs lymphoma.    - s/p IV pamidronate 60mg in the ED     - s/p 3.5L NS bolus and maintenance fluids     - on Day 3, patient with SOB--CXR with pulmonary effusions bilaterally, so d/c'ed fluids     - obtained echo before discharge --will need to follow up     - low PTH--9-10    - 1, 25 dihydroxy vitamin D -- 256.8, 25 hydroxy vitamin D: 271.6     - TSH 0.747, free T4 1.05, Total T3 88, , ACEi 52    - both vitamin D elevated, which would be more consistent with granulomatous disorder or lymphoma.     - will need to follow up immunofixation assay and PTHrP         #NENO     - Cr bumped to 1.6 from 1.1 on admission, likely 2/2 pamidronate as this can cause elevated Cr     - downtrended at discharge 1.21         #dyspnea     - pt became short of breath on day 3 of admission, CXR found to have fluid in lungs and facial swelling, no pitting edema     - no known history of heart failure, consider evaluating with echo    - shortness of breath resolved with lasix and duonebs x3    - s/p IV lasix 20mg x2, stopped maintenance fluids        #CT chest imaging abnormality     CT chest with no defined mass however several bilateral micronodules measuring up to 5 mm in the right upper lobe     - will require further workup         #CT abdomen imaging abnormality     - enlarged fibroid uterus, with a somewhat atypical appearance, although nonspecific.     - follow-up MRI pelvis is recommended to exclude potential malignant degeneration.    - follow up with ob/gyn outpatient and MRI outpatient         New medications:     - STOP vitamin D         Labs to be followed outpatient:     CBC, BMP (Ca), PTHrP, ACEi, immunofixation assay        Exam to be followed outpatient:     lungs, genitourinary exam        Other:    MRI abdomen, CT chest Patient with pmh of sciatica s/p L4-L5 laminectomy, hx of b/l breast implants,  hx of thyroid disease in past, 45 year smoking history, hx of psoriasis and psoriatic arthritis (recently started on biologics-first dose 9/9) presenting after outpatient labs notable for hypercalcemia, admitted for further management. Pt was managed with 2L fluid bolus in the ED and started on maintenance fluids while inpatient as well as given IV pamidronate 60mg once in the ED. While inpatient pt was continued on maintenance fluids NS @150cc/hr, increased to 200cc/hr. Her calcium came down to 10.4 on time of discharge. On day 2 of admission, pt was experiencing chills and was febrile to 103.9F rectally. Pt was given tylenol, given 1.5L of fluids and blood cultures were collected. Blood cultures were negative x2, CXR clear, and U/A neg. Next day, patient             Problem List/Main Diagnoses (system-based):         Inpatient treatment course:         #hypercalcemia     Pt with hypercalcemia to 16 on admission. Likely mixed picture of vitamin D overdose and possible malignancy, given risk factors and history, as well as labs. Etiology likely gramulomatous disorder vs lymphoma.    - s/p IV pamidronate 60mg in the ED     - s/p 3.5L NS bolus and maintenance fluids     - on Day 3, patient with SOB--CXR with pulmonary effusions bilaterally, so d/c'ed fluids     - obtained echo before discharge --follow up    - low PTH--9-10    - 1, 25 dihydroxy vitamin D -- 256.8, 25 hydroxy vitamin D: 271.6     - TSH 0.747, free T4 1.05, Total T3 88, , ACEi 52    - both vitamin D elevated, which would be more consistent with granulomatous disorder or lymphoma.     - will need to follow up immunofixation assay and PTHrP         #NENO     - Cr bumped to 1.6 from 1.1 on admission, likely 2/2 pamidronate as this can cause elevated Cr     - downtrended at discharge 1.21         #dyspnea     - pt became short of breath on day 3 of admission, CXR found to have fluid in lungs and facial swelling, no pitting edema     - no known history of heart failure, consider evaluating with echo    - shortness of breath resolved with lasix and duonebs x3    - s/p IV lasix 20mg x2, stopped maintenance fluids        #CT chest imaging abnormality     CT chest with no defined mass however several bilateral micronodules measuring up to 5 mm in the right upper lobe     - will require further workup         #CT abdomen imaging abnormality     - enlarged fibroid uterus, with a somewhat atypical appearance, although nonspecific.     - follow-up MRI pelvis is recommended to exclude potential malignant degeneration.    - follow up with ob/gyn outpatient and MRI outpatient         New medications:     - STOP vitamin D         Labs to be followed outpatient:     CBC, BMP (Ca), PTHrP, immunofixation assay, echo        Exam to be followed outpatient:     lungs, genitourinary exam        Other:    MRI abdomen, CT chest

## 2019-09-17 NOTE — PROGRESS NOTE ADULT - ATTENDING COMMENTS
Pt seen on rounds this afternoon and events of the weekend were reviewed.  Had fever on 9/14, now resolved (?? reaction to pamidronate).  Was given Lasix for suspected fluid overload earlier today but still appears mildly dyspneic and has end-inspiratory bibasilar rales on exam (R >> L).    Calcium is down to 10.6 uncorrected.  Electrolytes shows a hyperchloremic acidosis, likely from the large amounts of NS.  25D and 1,25 D are both markedly elevated, and presumably the cause of the hypercalcemia.  The elevated 25-D can easily be attributed to her excessive intake, but the 1.25 D is likely primarily responsible for the hypercalcemia and is not usually the finding in vitamin D excess, since the usual stimulus for 1.25 D formation is PTH, which has been suppressed in this pt.  Need to consider the possibility of a second disorder which would increase 1,25-D levels directly, which would be granulomatous disease (?? sarcoid, ACE level still pending) or lymphoma.  Her inability to tolerate the IV saline is surprising, and would obtain an echocardiogram.  She still appears SOB and would get another CXR later tonight.  Note that her calcium level could start to rise once again quite quickly as the pamidronate wears off
Pt seen on rounds this afternoon.  Case was also briefly discussed at our Endocrine conference this morning.  Pt's corrected serum calcium has decreased to 11.0 mg% today.  It is possible that it will continue to fall, but the lack of aggressive IV hydration may be limiting the response  It is difficult to predict how long the pamidronate effect will last--calcium level could begin to rise once again in a few days, or it could take as long as 2-3 weeks before she "escapes" from the pamidronate effect.  In terms of etiology, she clearly has vitamin D intoxication, but the level of 25-D by itself is not likely high enough to produce this severity of hypercalcemia.  In addition, the markedly elevated level of 1.25-D is not what is usually seen in these cases, and definitely suggests a second etiology.  Assuming that the 1,25 is being directly secreted, the most likely diagnoses (as mentioned previously) would be granulomatous disease or lymphoma, with a stronger suspicion for the former.  Would still be suspicious regarding sarcoidosis despite the normal ACE level, and this should be further evaluated--?? gallium scan if a follow-up CT showed diffuse micronodular changes in the lungs.  Pt will be seeing her rheumatologist in 2 days, and he will check her calcium level and assume further management.  (She will not be able to follow up with us because we do not take her insurance).  EXplained to her the additional diagnostic possibilities and what might need to be done.  Dr. Cheema will also speak to the rheumatologist
Notably, patient febrile by midday, reporting that she had chills the night before and now feeling warm.  Denies cough, sore throat, LUTS or diarrhea.  Denies enlarged glands/lymph nodes and unintentional weight loss.      -- fever work-up with blood and urine cultures, RVP, U/A and CXR  -- s/p Pamidronate and will trend corrected Ca  -- increase IVFs   -- monitor fever curve and tachycardia   -- f/u malignancy work-up labs    Christa Borrero MD  Hospitalist Attending  [phone]
Pt seen and examined by me at bedside earlier in AM. Agree with above with additions,   noted pt to be tachypenic, with audible wheezing, +crackle at bases, no JVD.   +facial edema, no LEs edema.   labs reviewed  a/p;  1, Hypercalcemia likely 2/2 Vit D intoxication, however malignancy cannot be ruled out.   outpatient MRI pelvis, possible repeat CT chest for lung nodules +smoker  calcium downtrending, dc IVF as patient symptomatic with fluid overloaded, CXR +vascular congestion, lasix 20mg ivx1, monitor u/o  s/p pamidronate, endo following  2. NENO multifactorial, downtrending, monitor.     Agree with rest of a/p as above.

## 2019-09-19 DIAGNOSIS — F17.200 NICOTINE DEPENDENCE, UNSPECIFIED, UNCOMPLICATED: ICD-10-CM

## 2019-09-19 DIAGNOSIS — T45.2X1A POISONING BY VITAMINS, ACCIDENTAL (UNINTENTIONAL), INITIAL ENCOUNTER: ICD-10-CM

## 2019-09-19 DIAGNOSIS — J43.9 EMPHYSEMA, UNSPECIFIED: ICD-10-CM

## 2019-09-19 DIAGNOSIS — M51.16 INTERVERTEBRAL DISC DISORDERS WITH RADICULOPATHY, LUMBAR REGION: ICD-10-CM

## 2019-09-19 DIAGNOSIS — L40.50 ARTHROPATHIC PSORIASIS, UNSPECIFIED: ICD-10-CM

## 2019-09-19 DIAGNOSIS — E87.6 HYPOKALEMIA: ICD-10-CM

## 2019-09-19 DIAGNOSIS — R79.89 OTHER SPECIFIED ABNORMAL FINDINGS OF BLOOD CHEMISTRY: ICD-10-CM

## 2019-09-19 DIAGNOSIS — N17.9 ACUTE KIDNEY FAILURE, UNSPECIFIED: ICD-10-CM

## 2019-09-19 DIAGNOSIS — J90 PLEURAL EFFUSION, NOT ELSEWHERE CLASSIFIED: ICD-10-CM

## 2019-09-19 DIAGNOSIS — Z98.82 BREAST IMPLANT STATUS: ICD-10-CM

## 2019-09-19 DIAGNOSIS — E83.52 HYPERCALCEMIA: ICD-10-CM

## 2019-09-19 DIAGNOSIS — Z88.0 ALLERGY STATUS TO PENICILLIN: ICD-10-CM

## 2019-09-19 DIAGNOSIS — E07.9 DISORDER OF THYROID, UNSPECIFIED: ICD-10-CM

## 2019-09-19 LAB
CULTURE RESULTS: SIGNIFICANT CHANGE UP
CULTURE RESULTS: SIGNIFICANT CHANGE UP
PTH RELATED PROT SERPL-MCNC: <2 PMOL/L — SIGNIFICANT CHANGE UP
PTH RELATED PROT SERPL-MCNC: <2 PMOL/L — SIGNIFICANT CHANGE UP
SPECIMEN SOURCE: SIGNIFICANT CHANGE UP
SPECIMEN SOURCE: SIGNIFICANT CHANGE UP

## 2019-09-21 ENCOUNTER — EMERGENCY (EMERGENCY)
Facility: HOSPITAL | Age: 54
LOS: 1 days | Discharge: HOME | End: 2019-09-21
Admitting: EMERGENCY MEDICINE
Payer: MEDICAID

## 2019-09-21 ENCOUNTER — EMERGENCY (EMERGENCY)
Facility: HOSPITAL | Age: 54
LOS: 1 days | Discharge: HOME | End: 2019-09-21
Attending: EMERGENCY MEDICINE | Admitting: EMERGENCY MEDICINE
Payer: MEDICAID

## 2019-09-21 VITALS
RESPIRATION RATE: 16 BRPM | TEMPERATURE: 98 F | OXYGEN SATURATION: 97 % | HEART RATE: 83 BPM | DIASTOLIC BLOOD PRESSURE: 71 MMHG | SYSTOLIC BLOOD PRESSURE: 127 MMHG

## 2019-09-21 VITALS
TEMPERATURE: 98 F | DIASTOLIC BLOOD PRESSURE: 73 MMHG | RESPIRATION RATE: 17 BRPM | OXYGEN SATURATION: 98 % | HEART RATE: 82 BPM | SYSTOLIC BLOOD PRESSURE: 115 MMHG

## 2019-09-21 VITALS
TEMPERATURE: 98 F | WEIGHT: 123.9 LBS | OXYGEN SATURATION: 94 % | SYSTOLIC BLOOD PRESSURE: 110 MMHG | DIASTOLIC BLOOD PRESSURE: 70 MMHG | RESPIRATION RATE: 16 BRPM | HEART RATE: 98 BPM

## 2019-09-21 DIAGNOSIS — Z98.82 BREAST IMPLANT STATUS: Chronic | ICD-10-CM

## 2019-09-21 DIAGNOSIS — Z98.890 OTHER SPECIFIED POSTPROCEDURAL STATES: Chronic | ICD-10-CM

## 2019-09-21 LAB
ALBUMIN SERPL ELPH-MCNC: 2.7 G/DL — LOW (ref 3.4–5)
ALP SERPL-CCNC: 77 U/L — SIGNIFICANT CHANGE UP (ref 40–120)
ALT FLD-CCNC: 20 U/L — SIGNIFICANT CHANGE UP (ref 12–42)
ANION GAP SERPL CALC-SCNC: 8 MMOL/L — LOW (ref 9–16)
AST SERPL-CCNC: 28 U/L — SIGNIFICANT CHANGE UP (ref 15–37)
BILIRUB SERPL-MCNC: 0.3 MG/DL — SIGNIFICANT CHANGE UP (ref 0.2–1.2)
BUN SERPL-MCNC: 15 MG/DL — SIGNIFICANT CHANGE UP (ref 7–23)
CALCIUM SERPL-MCNC: 11.6 MG/DL — HIGH (ref 8.5–10.5)
CHLORIDE SERPL-SCNC: 107 MMOL/L — SIGNIFICANT CHANGE UP (ref 96–108)
CO2 SERPL-SCNC: 27 MMOL/L — SIGNIFICANT CHANGE UP (ref 22–31)
CREAT SERPL-MCNC: 0.88 MG/DL — SIGNIFICANT CHANGE UP (ref 0.5–1.3)
D DIMER BLD IA.RAPID-MCNC: 317 NG/ML DDU — HIGH
GLUCOSE SERPL-MCNC: 93 MG/DL — SIGNIFICANT CHANGE UP (ref 70–99)
HCT VFR BLD CALC: 28.7 % — LOW (ref 34.5–45)
HGB BLD-MCNC: 10 G/DL — LOW (ref 11.5–15.5)
MCHC RBC-ENTMCNC: 31 PG — SIGNIFICANT CHANGE UP (ref 27–34)
MCHC RBC-ENTMCNC: 34.8 G/DL — SIGNIFICANT CHANGE UP (ref 32–36)
MCV RBC AUTO: 88.9 FL — SIGNIFICANT CHANGE UP (ref 80–100)
PLATELET # BLD AUTO: 443 K/UL — HIGH (ref 150–400)
POTASSIUM SERPL-MCNC: 4.2 MMOL/L — SIGNIFICANT CHANGE UP (ref 3.5–5.3)
POTASSIUM SERPL-SCNC: 4.2 MMOL/L — SIGNIFICANT CHANGE UP (ref 3.5–5.3)
PROT SERPL-MCNC: 6.5 G/DL — SIGNIFICANT CHANGE UP (ref 6.4–8.2)
RBC # BLD: 3.23 M/UL — LOW (ref 3.8–5.2)
RBC # FLD: 13.2 % — SIGNIFICANT CHANGE UP (ref 10.3–14.5)
SODIUM SERPL-SCNC: 142 MMOL/L — SIGNIFICANT CHANGE UP (ref 132–145)
WBC # BLD: 11.2 K/UL — HIGH (ref 3.8–10.5)
WBC # FLD AUTO: 11.2 K/UL — HIGH (ref 3.8–10.5)

## 2019-09-21 PROCEDURE — 99284 EMERGENCY DEPT VISIT MOD MDM: CPT

## 2019-09-21 PROCEDURE — 93970 EXTREMITY STUDY: CPT | Mod: 26

## 2019-09-21 NOTE — ED PROVIDER NOTE - PATIENT PORTAL LINK FT
You can access the FollowMyHealth Patient Portal offered by [facility] by registering at the following website: [email or web address]. By joining [employer]’s FollowMyHealth portal, you will also be able to view your health information using other applications (apps) compatible with our system.

## 2019-09-21 NOTE — ED PROVIDER NOTE - PHYSICAL EXAMINATION
CONSTITUTIONAL: Well-developed; well-nourished; in no acute distress.  	SKIN: Skin is warm and dry, no acute rash.  	HEAD: Normocephalic; atraumatic.  	EYES: clear bilaterally  	ENT:  airway patent  	NECK: Supple; non tender.  	CARD: S1, S2 normal; no murmurs, gallops, or rubs. Regular rate and rhythm.  	RESP: No wheezes, rales or rhonchi.  	ABD: ; soft; non-distended; non-tender  	EXT: mild bilateral ankle swelling. no calf swelling or tenderness. no erythema. full ROM joints. distal pulses intact intact. no sensory or motor deficits. ambulatory  	NEURO: Alert, oriented. Grossly unremarkable.  PSYCH: Cooperative, appropriate.

## 2019-09-21 NOTE — ED ADULT NURSE NOTE - CHIEF COMPLAINT QUOTE
ambulatory, complaining of right lower leg pain and swelling. Admitted to [facility] last week for hypercalcemia. Lab work taken yesterday from PCP, with Ca level of 11.7

## 2019-09-21 NOTE — ED PROVIDER NOTE - NSFOLLOWUPINSTRUCTIONS_ED_ALL_ED_FT
Please see your primary care doctor in the next 2-3 days for a repeat evaluation.  Please take all of today's paperwork with you.  If you don't have a doctor or would like help finding a new one, please contact our call center at [phone].    Please return immediately to the Emergency Department if you have pain, fever, trouble breathing, a rash, or if you have any other problems or concerns at all.   You can reach us at [phone], 24 hours a day, 7 days a week.

## 2019-09-21 NOTE — ED PROVIDER NOTE - PATIENT PORTAL LINK FT
You can access the FollowMyHealth Patient Portal offered by [facility] by registering at the following website: [email or web address]. By joining [employer]’s FollowMyHealth portal, you will also be able to view your health information using other applications (apps) compatible with our system.

## 2019-09-21 NOTE — ED PROVIDER NOTE - CLINICAL SUMMARY MEDICAL DECISION MAKING FREE TEXT BOX
recent hospitalization for hypercalcemia, had fluid overload state during hospitalization which improved, c/o bilateral ankle swelling, ca 11.6 consistent with trend, +dimer, advised to return this morning for US r/o DVT.

## 2019-09-21 NOTE — ED ADULT NURSE NOTE - NSIMPLEMENTINTERV_GEN_ALL_ED
Implemented All Universal Safety Interventions:  [place] to call system. Call bell, personal items and telephone within reach. Instruct patient to call for assistance. Room bathroom lighting operational. Non-slip footwear when patient is off stretcher. Physically safe environment: no spills, clutter or unnecessary equipment. Stretcher in lowest position, wheels locked, appropriate side rails in place.

## 2019-09-21 NOTE — ED PROVIDER NOTE - CLINICAL SUMMARY MEDICAL DECISION MAKING FREE TEXT BOX
Returned for bilateral lower extremity dopplers, see prior visit chart.  Dopplers negative, symptoms/swelling has much improved since last night.  Will d/c home to followup with PMD as outpatient.

## 2019-09-21 NOTE — ED PROVIDER NOTE - PHYSICAL EXAMINATION
Bilateral lower extremities: mild bilateral edema, negative ifeanyi's sign.  No redness or evidence of infection.  Normal pulses, movement, sensation, strength, cap refill.

## 2019-09-21 NOTE — ED PROVIDER NOTE - CHIEF COMPLAINT
The patient is a 54y Female complaining of The patient is a 54y Female complaining of return for ultrasound

## 2019-09-21 NOTE — ED ADULT NURSE NOTE - OBJECTIVE STATEMENT
Pt is a 54y female complaining of bilateral foot swelling. Pt was recently discharged for hypercalcemia from [facility]. Pt states that her last calcium draw was 11.7 yesterday  + bilateral pedal pulses. PT denies chest pain, sob.

## 2019-09-21 NOTE — ED PROVIDER NOTE - OBJECTIVE STATEMENT
discharged a few days from [facility] for hypercalcemia >15 improved upon discharge, also had swelling to face/trunk from probable fluid overload during admission, has been following with pmd who closely watches calcium 2x/weekly, c/o bilateral ankle swelling x 2 days. denies pain. no dyspnea/chest pain. no hx PE or DVT. no fever or chills. ambulatory.

## 2019-09-21 NOTE — ED ADULT TRIAGE NOTE - CHIEF COMPLAINT QUOTE
ambulatory, complaining of right lower leg pain and swelling. Admitted to [facility] last week for hypercalcemia. ambulatory, complaining of right lower leg pain and swelling. Admitted to [facility] last week for hypercalcemia. Lab work taken yesterday from PCP, with Ca level of 11.7

## 2019-09-21 NOTE — ED PROVIDER NOTE - NSFOLLOWUPINSTRUCTIONS_ED_ALL_ED_FT
Please return at 9am for an ultrasound of your legs to rule out blood clots  Leg elevation. Compression stockings.    Please follow up with your doctor    Return to the Emergency Department for inability to walk, worsening or any concerning symptoms.

## 2019-09-21 NOTE — ED PROVIDER NOTE - OBJECTIVE STATEMENT
53 y/o patient was seen here last night (please see prior chart) and was advised to return here this morning to have bilateral lower extremity dopplers to r/o DVT.  Patient was recently hospitalized for issues with calcium and during the hospitalization she received a lot of IVF and states she was diffusely edematous.  Last night both of her legs were very swollen but today they are improved.  Neither leg hurts, nor does either have redness or evidence of infection.

## 2019-09-26 DIAGNOSIS — M25.471 EFFUSION, RIGHT ANKLE: ICD-10-CM

## 2019-09-26 DIAGNOSIS — M25.472 EFFUSION, LEFT ANKLE: ICD-10-CM

## 2019-09-26 DIAGNOSIS — M79.89 OTHER SPECIFIED SOFT TISSUE DISORDERS: ICD-10-CM

## 2019-09-26 DIAGNOSIS — M79.661 PAIN IN RIGHT LOWER LEG: ICD-10-CM

## 2019-09-26 DIAGNOSIS — R60.0 LOCALIZED EDEMA: ICD-10-CM

## 2019-09-26 DIAGNOSIS — Z88.0 ALLERGY STATUS TO PENICILLIN: ICD-10-CM

## 2019-09-26 DIAGNOSIS — M79.662 PAIN IN LEFT LOWER LEG: ICD-10-CM

## 2019-09-30 ENCOUNTER — EMERGENCY (EMERGENCY)
Facility: HOSPITAL | Age: 54
LOS: 1 days | Discharge: HOME | End: 2019-09-30
Admitting: EMERGENCY MEDICINE
Payer: MEDICAID

## 2019-09-30 VITALS
WEIGHT: 121.92 LBS | TEMPERATURE: 98 F | RESPIRATION RATE: 16 BRPM | HEART RATE: 95 BPM | SYSTOLIC BLOOD PRESSURE: 120 MMHG | OXYGEN SATURATION: 98 % | HEIGHT: 64 IN | DIASTOLIC BLOOD PRESSURE: 62 MMHG

## 2019-09-30 VITALS
SYSTOLIC BLOOD PRESSURE: 101 MMHG | TEMPERATURE: 98 F | HEART RATE: 80 BPM | OXYGEN SATURATION: 98 % | DIASTOLIC BLOOD PRESSURE: 64 MMHG | RESPIRATION RATE: 18 BRPM

## 2019-09-30 DIAGNOSIS — Z98.890 OTHER SPECIFIED POSTPROCEDURAL STATES: Chronic | ICD-10-CM

## 2019-09-30 DIAGNOSIS — Z98.82 BREAST IMPLANT STATUS: Chronic | ICD-10-CM

## 2019-09-30 LAB
ALBUMIN SERPL ELPH-MCNC: 3 G/DL — LOW (ref 3.4–5)
ALP SERPL-CCNC: 89 U/L — SIGNIFICANT CHANGE UP (ref 40–120)
ALT FLD-CCNC: 11 U/L — LOW (ref 12–42)
ANION GAP SERPL CALC-SCNC: 2 MMOL/L — LOW (ref 9–16)
AST SERPL-CCNC: 10 U/L — LOW (ref 15–37)
BASOPHILS NFR BLD AUTO: 0.8 % — SIGNIFICANT CHANGE UP (ref 0–2)
BILIRUB SERPL-MCNC: 0.1 MG/DL — LOW (ref 0.2–1.2)
BUN SERPL-MCNC: 25 MG/DL — HIGH (ref 7–23)
CALCIUM SERPL-MCNC: 10.2 MG/DL — SIGNIFICANT CHANGE UP (ref 8.5–10.5)
CALCIUM SERPL-MCNC: 11.9 MG/DL — HIGH (ref 8.5–10.5)
CHLORIDE SERPL-SCNC: 104 MMOL/L — SIGNIFICANT CHANGE UP (ref 96–108)
CO2 SERPL-SCNC: 32 MMOL/L — HIGH (ref 22–31)
CREAT SERPL-MCNC: 0.87 MG/DL — SIGNIFICANT CHANGE UP (ref 0.5–1.3)
EOSINOPHIL NFR BLD AUTO: 1.3 % — SIGNIFICANT CHANGE UP (ref 0–6)
GLUCOSE SERPL-MCNC: 89 MG/DL — SIGNIFICANT CHANGE UP (ref 70–99)
HCT VFR BLD CALC: 32 % — LOW (ref 34.5–45)
HGB BLD-MCNC: 10.7 G/DL — LOW (ref 11.5–15.5)
IMM GRANULOCYTES NFR BLD AUTO: 0.3 % — SIGNIFICANT CHANGE UP (ref 0–1.5)
LYMPHOCYTES # BLD AUTO: 24.8 % — SIGNIFICANT CHANGE UP (ref 13–44)
MAGNESIUM SERPL-MCNC: 2 MG/DL — SIGNIFICANT CHANGE UP (ref 1.6–2.6)
MCHC RBC-ENTMCNC: 30.4 PG — SIGNIFICANT CHANGE UP (ref 27–34)
MCHC RBC-ENTMCNC: 33.4 G/DL — SIGNIFICANT CHANGE UP (ref 32–36)
MCV RBC AUTO: 90.9 FL — SIGNIFICANT CHANGE UP (ref 80–100)
MONOCYTES NFR BLD AUTO: 6.9 % — SIGNIFICANT CHANGE UP (ref 2–14)
NEUTROPHILS NFR BLD AUTO: 65.9 % — SIGNIFICANT CHANGE UP (ref 43–77)
PLATELET # BLD AUTO: 365 K/UL — SIGNIFICANT CHANGE UP (ref 150–400)
POTASSIUM SERPL-MCNC: 4.5 MMOL/L — SIGNIFICANT CHANGE UP (ref 3.5–5.3)
POTASSIUM SERPL-SCNC: 4.5 MMOL/L — SIGNIFICANT CHANGE UP (ref 3.5–5.3)
PROT SERPL-MCNC: 7.1 G/DL — SIGNIFICANT CHANGE UP (ref 6.4–8.2)
RBC # BLD: 3.52 M/UL — LOW (ref 3.8–5.2)
RBC # FLD: 12.8 % — SIGNIFICANT CHANGE UP (ref 10.3–14.5)
SODIUM SERPL-SCNC: 138 MMOL/L — SIGNIFICANT CHANGE UP (ref 132–145)
WBC # BLD: 10 K/UL — SIGNIFICANT CHANGE UP (ref 3.8–10.5)
WBC # FLD AUTO: 10 K/UL — SIGNIFICANT CHANGE UP (ref 3.8–10.5)

## 2019-09-30 PROCEDURE — 99284 EMERGENCY DEPT VISIT MOD MDM: CPT

## 2019-09-30 RX ORDER — SODIUM CHLORIDE 9 MG/ML
2000 INJECTION INTRAMUSCULAR; INTRAVENOUS; SUBCUTANEOUS ONCE
Refills: 0 | Status: COMPLETED | OUTPATIENT
Start: 2019-09-30 | End: 2019-09-30

## 2019-09-30 RX ORDER — SODIUM CHLORIDE 9 MG/ML
1000 INJECTION INTRAMUSCULAR; INTRAVENOUS; SUBCUTANEOUS ONCE
Refills: 0 | Status: COMPLETED | OUTPATIENT
Start: 2019-09-30 | End: 2019-09-30

## 2019-09-30 RX ADMIN — SODIUM CHLORIDE 2000 MILLILITER(S): 9 INJECTION INTRAMUSCULAR; INTRAVENOUS; SUBCUTANEOUS at 19:53

## 2019-09-30 RX ADMIN — SODIUM CHLORIDE 1000 MILLILITER(S): 9 INJECTION INTRAMUSCULAR; INTRAVENOUS; SUBCUTANEOUS at 21:56

## 2019-09-30 NOTE — ED PROVIDER NOTE - NSFOLLOWUPINSTRUCTIONS_ED_ALL_ED_FT
Follow up with primary care provider for a MRI of your lungs and abdomen, cardiology for an ultrasound of  your heart and gynecology for an MRI of your pelvis to further characterize incidental findings noted on your previous imaging as malignancy is a concern when one presents with persistently elevated calcium. Follow up with primary care provider for a MRI of your lungs and abdomen, cardiology for an ultrasound of  your heart and gynecology for an MRI of your pelvis to further characterize incidental findings noted on your previous imaging as malignancy is a concern when one presents with persistently elevated calcium levels.

## 2019-09-30 NOTE — ED PROVIDER NOTE - CARE PROVIDER_API CALL
Nikolai Newberry)  Cardiovascular Disease  [address] [facility], [address] [place] [address]  Phone: [phone]  Fax: [phone]  Follow Up Time:     Cecilio Ring)  Obstetrics and Gynecology  [address] [address]  Phone: [phone]  Fax: [phone]  Follow Up Time:

## 2019-09-30 NOTE — ED PROVIDER NOTE - PATIENT PORTAL LINK FT
You can access the FollowMyHealth Patient Portal offered by [facility] by registering at the following website: [email or web address]. By joining [email or web address]’s FollowMyHealth portal, you will also be able to view your health information using other applications (apps) compatible with our system.

## 2019-09-30 NOTE — ED ADULT TRIAGE NOTE - CHIEF COMPLAINT QUOTE
Patient sent by her MD for abnormals calcium levels Patient sent by her MD for abnormals calcium levels, 12.2

## 2019-09-30 NOTE — ED PROVIDER NOTE - CARE PROVIDERS DIRECT ADDRESSES
[email or web address]

## 2019-09-30 NOTE — ED PROVIDER NOTE - OBJECTIVE STATEMENT
53 y/o F with PMH of sciatica s/p laminectomy L4L5, bilat breast augmentation, thyroid disease, psoriatic arthritis, hypercalcemia presents to ED, sent in by PCP for elevated calcium level of 12.  Pt discharged from [facility] after admission for hypercalcemia 9/17/19 and advised to f/u with primary care provider and MRI to further characterize lung nodules and uterine fibroid.  She was discharged with a calcium level of 11.6.  Pt denies any symptoms.

## 2019-09-30 NOTE — ED ADULT NURSE NOTE - NSIMPLEMENTINTERV_GEN_ALL_ED
Implemented All Fall with Harm Risk Interventions:  [place] to call system. Call bell, personal items and telephone within reach. Instruct patient to call for assistance. Room bathroom lighting operational. Non-slip footwear when patient is off stretcher. Physically safe environment: no spills, clutter or unnecessary equipment. Stretcher in lowest position, wheels locked, appropriate side rails in place. Provide visual cue, wrist band, yellow gown, etc. Monitor gait and stability. Monitor for mental status changes and reorient to person, place, and time. Review medications for side effects contributing to fall risk. Reinforce activity limits and safety measures with patient and family. Provide visual clues: red socks.

## 2019-09-30 NOTE — ED PROVIDER NOTE - CLINICAL SUMMARY MEDICAL DECISION MAKING FREE TEXT BOX
55 y/o F presents to ED for evaluation of elevated calcium level.  Pt well appearing, VSS.  Labs notable for elevated calcium of 11.9.  will hydrated with IVFS and repeat.

## 2019-10-05 DIAGNOSIS — G89.29 OTHER CHRONIC PAIN: ICD-10-CM

## 2019-10-05 DIAGNOSIS — Z88.0 ALLERGY STATUS TO PENICILLIN: ICD-10-CM

## 2019-10-05 DIAGNOSIS — R79.89 OTHER SPECIFIED ABNORMAL FINDINGS OF BLOOD CHEMISTRY: ICD-10-CM

## 2019-10-05 DIAGNOSIS — E83.52 HYPERCALCEMIA: ICD-10-CM

## 2019-10-05 DIAGNOSIS — F17.200 NICOTINE DEPENDENCE, UNSPECIFIED, UNCOMPLICATED: ICD-10-CM

## 2019-10-05 DIAGNOSIS — M54.5 LOW BACK PAIN: ICD-10-CM

## 2021-03-21 PROBLEM — L40.50 ARTHROPATHIC PSORIASIS, UNSPECIFIED: Chronic | Status: ACTIVE | Noted: 2019-09-30

## 2021-03-24 ENCOUNTER — APPOINTMENT (OUTPATIENT)
Age: 56
End: 2021-03-24
Payer: MEDICAID

## 2021-03-24 PROCEDURE — 0001A: CPT

## 2021-03-30 NOTE — PATIENT PROFILE ADULT - NSTRANSFERBELONGINGSRESP_GEN_A_NUR
yes Calcipotriene Pregnancy And Lactation Text: This medication has not been proven safe during pregnancy. It is unknown if this medication is excreted in breast milk.

## 2021-12-10 NOTE — ED ADULT TRIAGE NOTE - NS ED NURSE DIRECT TO ROOM YN
Yes
You can access the FollowMyHealth Patient Portal offered by [facility] by registering at the following website: [email or web address]. By joining [employer]’s FollowMyHealth portal, you will also be able to view your health information using other applications (apps) compatible with our system.

## 2022-05-15 ENCOUNTER — EMERGENCY (EMERGENCY)
Facility: HOSPITAL | Age: 57
LOS: 1 days | Discharge: HOME | End: 2022-05-15
Admitting: EMERGENCY MEDICINE
Payer: MEDICAID

## 2022-05-15 VITALS
DIASTOLIC BLOOD PRESSURE: 88 MMHG | OXYGEN SATURATION: 96 % | TEMPERATURE: 98 F | RESPIRATION RATE: 18 BRPM | WEIGHT: 136.91 LBS | SYSTOLIC BLOOD PRESSURE: 130 MMHG | HEIGHT: 64 IN | HEART RATE: 97 BPM

## 2022-05-15 VITALS
RESPIRATION RATE: 16 BRPM | SYSTOLIC BLOOD PRESSURE: 111 MMHG | HEART RATE: 87 BPM | OXYGEN SATURATION: 99 % | TEMPERATURE: 98 F | DIASTOLIC BLOOD PRESSURE: 62 MMHG

## 2022-05-15 DIAGNOSIS — R14.0 ABDOMINAL DISTENSION (GASEOUS): ICD-10-CM

## 2022-05-15 DIAGNOSIS — Z98.890 OTHER SPECIFIED POSTPROCEDURAL STATES: Chronic | ICD-10-CM

## 2022-05-15 DIAGNOSIS — R10.9 UNSPECIFIED ABDOMINAL PAIN: ICD-10-CM

## 2022-05-15 DIAGNOSIS — Z88.0 ALLERGY STATUS TO PENICILLIN: ICD-10-CM

## 2022-05-15 DIAGNOSIS — K59.00 CONSTIPATION, UNSPECIFIED: ICD-10-CM

## 2022-05-15 DIAGNOSIS — Z98.82 BREAST IMPLANT STATUS: Chronic | ICD-10-CM

## 2022-05-15 LAB
ALBUMIN SERPL ELPH-MCNC: 3.6 G/DL — SIGNIFICANT CHANGE UP (ref 3.4–5)
ALP SERPL-CCNC: 86 U/L — SIGNIFICANT CHANGE UP (ref 40–120)
ALT FLD-CCNC: 18 U/L — SIGNIFICANT CHANGE UP (ref 12–42)
ANION GAP SERPL CALC-SCNC: 8 MMOL/L — LOW (ref 9–16)
APPEARANCE UR: CLEAR — SIGNIFICANT CHANGE UP
AST SERPL-CCNC: 8 U/L — LOW (ref 15–37)
BACTERIA # UR AUTO: ABNORMAL /HPF
BASOPHILS # BLD AUTO: 0.06 K/UL — SIGNIFICANT CHANGE UP (ref 0–0.2)
BASOPHILS NFR BLD AUTO: 0.7 % — SIGNIFICANT CHANGE UP (ref 0–2)
BILIRUB SERPL-MCNC: 0.3 MG/DL — SIGNIFICANT CHANGE UP (ref 0.2–1.2)
BILIRUB UR-MCNC: NEGATIVE — SIGNIFICANT CHANGE UP
BUN SERPL-MCNC: 3 MG/DL — LOW (ref 7–23)
CALCIUM SERPL-MCNC: 9.3 MG/DL — SIGNIFICANT CHANGE UP (ref 8.5–10.5)
CHLORIDE SERPL-SCNC: 105 MMOL/L — SIGNIFICANT CHANGE UP (ref 96–108)
CO2 SERPL-SCNC: 26 MMOL/L — SIGNIFICANT CHANGE UP (ref 22–31)
COLOR SPEC: YELLOW — SIGNIFICANT CHANGE UP
CREAT SERPL-MCNC: 0.7 MG/DL — SIGNIFICANT CHANGE UP (ref 0.5–1.3)
DIFF PNL FLD: ABNORMAL
EGFR: 101 ML/MIN/1.73M2 — SIGNIFICANT CHANGE UP
EOSINOPHIL # BLD AUTO: 0.1 K/UL — SIGNIFICANT CHANGE UP (ref 0–0.5)
EOSINOPHIL NFR BLD AUTO: 1.1 % — SIGNIFICANT CHANGE UP (ref 0–6)
EPI CELLS # UR: ABNORMAL /HPF (ref 0–5)
GLUCOSE SERPL-MCNC: 94 MG/DL — SIGNIFICANT CHANGE UP (ref 70–99)
GLUCOSE UR QL: NEGATIVE — SIGNIFICANT CHANGE UP
HCT VFR BLD CALC: 45.6 % — HIGH (ref 34.5–45)
HGB BLD-MCNC: 15.5 G/DL — SIGNIFICANT CHANGE UP (ref 11.5–15.5)
IMM GRANULOCYTES NFR BLD AUTO: 0.3 % — SIGNIFICANT CHANGE UP (ref 0–1.5)
KETONES UR-MCNC: ABNORMAL MG/DL
LACTATE SERPL-SCNC: 1 MMOL/L — SIGNIFICANT CHANGE UP (ref 0.4–2)
LEUKOCYTE ESTERASE UR-ACNC: ABNORMAL
LYMPHOCYTES # BLD AUTO: 2.26 K/UL — SIGNIFICANT CHANGE UP (ref 1–3.3)
LYMPHOCYTES # BLD AUTO: 25.5 % — SIGNIFICANT CHANGE UP (ref 13–44)
MAGNESIUM SERPL-MCNC: 1.8 MG/DL — SIGNIFICANT CHANGE UP (ref 1.6–2.6)
MCHC RBC-ENTMCNC: 31.2 PG — SIGNIFICANT CHANGE UP (ref 27–34)
MCHC RBC-ENTMCNC: 34 GM/DL — SIGNIFICANT CHANGE UP (ref 32–36)
MCV RBC AUTO: 91.8 FL — SIGNIFICANT CHANGE UP (ref 80–100)
MONOCYTES # BLD AUTO: 0.64 K/UL — SIGNIFICANT CHANGE UP (ref 0–0.9)
MONOCYTES NFR BLD AUTO: 7.2 % — SIGNIFICANT CHANGE UP (ref 2–14)
NEUTROPHILS # BLD AUTO: 5.77 K/UL — SIGNIFICANT CHANGE UP (ref 1.8–7.4)
NEUTROPHILS NFR BLD AUTO: 65.2 % — SIGNIFICANT CHANGE UP (ref 43–77)
NITRITE UR-MCNC: POSITIVE
NRBC # BLD: 0 /100 WBCS — SIGNIFICANT CHANGE UP (ref 0–0)
PH UR: 6 — SIGNIFICANT CHANGE UP (ref 5–8)
PLATELET # BLD AUTO: 258 K/UL — SIGNIFICANT CHANGE UP (ref 150–400)
POTASSIUM SERPL-MCNC: 3.8 MMOL/L — SIGNIFICANT CHANGE UP (ref 3.5–5.3)
POTASSIUM SERPL-SCNC: 3.8 MMOL/L — SIGNIFICANT CHANGE UP (ref 3.5–5.3)
PROT SERPL-MCNC: 7.2 G/DL — SIGNIFICANT CHANGE UP (ref 6.4–8.2)
PROT UR-MCNC: NEGATIVE MG/DL — SIGNIFICANT CHANGE UP
RBC # BLD: 4.97 M/UL — SIGNIFICANT CHANGE UP (ref 3.8–5.2)
RBC # FLD: 13.8 % — SIGNIFICANT CHANGE UP (ref 10.3–14.5)
RBC CASTS # UR COMP ASSIST: ABNORMAL /HPF
SODIUM SERPL-SCNC: 139 MMOL/L — SIGNIFICANT CHANGE UP (ref 132–145)
SP GR SPEC: <=1.005 — SIGNIFICANT CHANGE UP (ref 1–1.03)
UROBILINOGEN FLD QL: 0.2 E.U./DL — SIGNIFICANT CHANGE UP
WBC # BLD: 8.86 K/UL — SIGNIFICANT CHANGE UP (ref 3.8–10.5)
WBC # FLD AUTO: 8.86 K/UL — SIGNIFICANT CHANGE UP (ref 3.8–10.5)
WBC UR QL: ABNORMAL /HPF

## 2022-05-15 PROCEDURE — 99285 EMERGENCY DEPT VISIT HI MDM: CPT

## 2022-05-15 PROCEDURE — 74176 CT ABD & PELVIS W/O CONTRAST: CPT | Mod: 26

## 2022-05-15 RX ORDER — MULTIVIT WITH MIN/MFOLATE/K2 340-15/3 G
1 POWDER (GRAM) ORAL ONCE
Refills: 0 | Status: COMPLETED | OUTPATIENT
Start: 2022-05-15 | End: 2022-05-15

## 2022-05-15 RX ORDER — DOCUSATE SODIUM 100 MG
1 CAPSULE ORAL
Qty: 28 | Refills: 0
Start: 2022-05-15 | End: 2022-05-28

## 2022-05-15 RX ORDER — AZTREONAM 2 G
1 VIAL (EA) INJECTION
Qty: 6 | Refills: 0
Start: 2022-05-15 | End: 2022-05-17

## 2022-05-15 RX ORDER — DIATRIZOATE MEGLUMINE 180 MG/ML
30 INJECTION, SOLUTION INTRAVESICAL ONCE
Refills: 0 | Status: COMPLETED | OUTPATIENT
Start: 2022-05-15 | End: 2022-05-15

## 2022-05-15 RX ADMIN — DIATRIZOATE MEGLUMINE 30 MILLILITER(S): 180 INJECTION, SOLUTION INTRAVESICAL at 12:13

## 2022-05-15 RX ADMIN — Medication 1 BOTTLE: at 15:09

## 2022-05-15 NOTE — ED ADULT TRIAGE NOTE - CHIEF COMPLAINT QUOTE
c/o abdominal discomfort ,constipation and bloating x10 day. has been trying home remedies and adding fiber to diet with no relief.

## 2022-05-15 NOTE — ED ADULT TRIAGE NOTE - HEIGHT IN FEET
César calls back & results given. He wants to go ahead with the limited echo w/ contrast. Order placed, specifying need to use [facility], & to be done prior to 3/1 [facility] w/ Dr. Zamudio. He will call back if nothing heard from [facility] by 2/9. I will forward message to Padmini in scheduling to make sure this gets done.   5

## 2022-05-15 NOTE — ED ADULT NURSE NOTE - OBJECTIVE STATEMENT
Pt presents to ED complaining of constipation for 10 days. Endorses taking laxatives, enemas and home remedies without relief.  Denies vomiting, fevers, nausea, chills.

## 2022-05-15 NOTE — ED PROVIDER NOTE - OBJECTIVE STATEMENT
55 yo female with PMH of sciatica s/p laminectomy L4L5, bilat breast augmentation, thyroid disease, psoriatic arthritis presents c/o abdominal discomfort, constipation and bloating x10 days. has been increasing fibet intake with no relief. no fever/chills. no vomiting. tolerating po. no weight loss. no back pain/cp/sob/syncope. hx tummy tuck and surgery to "remove fat from colon vs abdominal cavity" many years ago 57 yo female with PMH of sciatica s/p laminectomy L4L5, bilat breast augmentation, thyroid disease, psoriatic arthritis presents c/o abdominal discomfort, constipation and bloating x10 days. last BM this morning, small brown hard stool. has been increasing fiber intake with no relief. no fever/chills. no vomiting. tolerating po. no weight loss. no back pain/cp/sob/syncope. hx tummy tuck and surgery to "remove fat from colon vs abdominal cavity" many years ago.  last colonoscopy 7 years ago where polyp was removed.

## 2022-05-15 NOTE — ED ADULT NURSE NOTE - NSHOSCREENINGQ1_ED_ALL_ED
Phoned patient she does not need Tramadol. Please cancel. Taking Shaun 2 tablets at night and Hydrocodone 1 tab during the day and 1 tab at night. No

## 2022-05-15 NOTE — ED PROVIDER NOTE - PATIENT PORTAL LINK FT
You can access the FollowMyHealth Patient Portal offered by [facility] by registering at the following website: [email or web address]. By joining [employer]’s FollowMyHealth portal, you will also be able to view your health information using other applications (apps) compatible with our system.

## 2022-05-15 NOTE — ED PROVIDER NOTE - CLINICAL SUMMARY MEDICAL DECISION MAKING FREE TEXT BOX
55 yo female with abdominal discomfort and constipation x 10 days, looks well, CT no bowel obstruction, +stool throughout colon, incidental findings discussed with patient. +UTI, patient requesting bactrim for 3 days. for constipation, advised high fiber diet, will give bottle of mag citrate to take at home, rx colace, advised f/u PMD and GI, return precautions discussed. will dc.

## 2022-05-15 NOTE — ED PROVIDER NOTE - CARE PROVIDER_API CALL
Teja Hernández)  Gastroenterology; Internal Medicine  [address] [place] [address]  Phone: [phone]  Fax: [phone]  Follow Up Time:

## 2022-05-15 NOTE — ED PROVIDER NOTE - NSFOLLOWUPINSTRUCTIONS_ED_ALL_ED_FT
Take medications as prescribed  Follow up with your doctor and gastroenterology.    Constipation is when a person has fewer than three bowel movements a week, has difficulty having a bowel movement, or has stools that are dry, hard, or larger than normal. Other symptoms can include abdominal pain or bloating. As people grow older, constipation is more common. A low-fiber diet, not taking in enough fluids, and taking certain medicines, including opioid painkillers, may make constipation worse. Treatment varies but may include dietary modifications (more fiber-rich foods), lifestyle modifications, and possible medications.     SEEK IMMEDIATE MEDICAL CARE IF YOU HAVE ANY OF THE FOLLOWING SYMPTOMS: bright red blood in your stool, constipation for longer than 4 days, abdominal or rectal pain, unexplained weight loss, or inability to pass gas.

## 2022-05-15 NOTE — ED PROVIDER NOTE - PHYSICAL EXAMINATION
CONSTITUTIONAL: Well-appearing; well-nourished; in no apparent distress.   	HEAD: Normocephalic; atraumatic.   	EYES:  conjunctiva and sclera clear  	ENT: normal nose; no rhinorrhea; normal pharynx with no erythema or lesions.   	NECK: Supple; non-tender;   	CARDIOVASCULAR: Normal S1, S2; no murmurs, rubs, or gallops. Regular rate and rhythm.   	RESPIRATORY: Breathing easily; breath sounds clear and equal bilaterally; no wheezes, rhonchi, or rales.  	GI: Soft; non-distended; non-tender. no cvat bilaterally  	EXT: MONTES x 4. ambulatory.   	SKIN: Normal for age and race; warm; dry; good turgor; no apparent lesions or rash.   	NEURO: A & O x 3; face symmetric; grossly unremarkable.   PSYCHOLOGICAL: The patient’s mood and manner are appropriate. CONSTITUTIONAL: Well-appearing; well-nourished; in no apparent distress.   	HEAD: Normocephalic; atraumatic.   	EYES:  conjunctiva and sclera clear  	ENT: normal nose; no rhinorrhea; normal pharynx with no erythema or lesions.   	NECK: Supple; non-tender;   	CARDIOVASCULAR: Normal S1, S2; no murmurs, rubs, or gallops. Regular rate and rhythm.   	RESPIRATORY: Breathing easily; breath sounds clear and equal bilaterally; no wheezes, rhonchi, or rales.  	GI: Soft; non-distended; mild upper abdominal tenderness. no guarding or rebound.  no cvat bilaterally  	EXT: MONTES x 4. ambulatory.   	SKIN: Normal for age and race; warm; dry; good turgor; no apparent lesions or rash.   	NEURO: A & O x 3; face symmetric; grossly unremarkable.   PSYCHOLOGICAL: The patient’s mood and manner are appropriate.

## 2022-05-17 ENCOUNTER — EMERGENCY (EMERGENCY)
Facility: HOSPITAL | Age: 57
LOS: 1 days | Discharge: HOME | End: 2022-05-17
Admitting: EMERGENCY MEDICINE
Payer: MEDICAID

## 2022-05-17 VITALS
DIASTOLIC BLOOD PRESSURE: 72 MMHG | HEART RATE: 87 BPM | HEIGHT: 64 IN | SYSTOLIC BLOOD PRESSURE: 116 MMHG | TEMPERATURE: 98 F | OXYGEN SATURATION: 98 % | WEIGHT: 136.69 LBS | RESPIRATION RATE: 16 BRPM

## 2022-05-17 VITALS
TEMPERATURE: 98 F | DIASTOLIC BLOOD PRESSURE: 68 MMHG | RESPIRATION RATE: 18 BRPM | OXYGEN SATURATION: 98 % | HEART RATE: 79 BPM | SYSTOLIC BLOOD PRESSURE: 127 MMHG

## 2022-05-17 DIAGNOSIS — Z98.82 BREAST IMPLANT STATUS: Chronic | ICD-10-CM

## 2022-05-17 DIAGNOSIS — Z98.890 OTHER SPECIFIED POSTPROCEDURAL STATES: Chronic | ICD-10-CM

## 2022-05-17 PROCEDURE — 99284 EMERGENCY DEPT VISIT MOD MDM: CPT

## 2022-05-17 RX ORDER — LACTULOSE 10 G/15ML
10 SOLUTION ORAL ONCE
Refills: 0 | Status: COMPLETED | OUTPATIENT
Start: 2022-05-17 | End: 2022-05-17

## 2022-05-17 RX ORDER — DIATRIZOATE MEGLUMINE 180 MG/ML
30 INJECTION, SOLUTION INTRAVESICAL ONCE
Refills: 0 | Status: DISCONTINUED | OUTPATIENT
Start: 2022-05-17 | End: 2022-05-17

## 2022-05-17 RX ORDER — SOD SULF/SODIUM/NAHCO3/KCL/PEG
4000 SOLUTION, RECONSTITUTED, ORAL ORAL ONCE
Refills: 0 | Status: COMPLETED | OUTPATIENT
Start: 2022-05-17 | End: 2022-05-17

## 2022-05-17 RX ORDER — LACTULOSE 10 G/15ML
30 SOLUTION ORAL
Qty: 1 | Refills: 0
Start: 2022-05-17 | End: 2022-05-24

## 2022-05-17 RX ADMIN — Medication 1 ENEMA: at 21:06

## 2022-05-17 RX ADMIN — Medication 4000 MILLILITER(S): at 21:51

## 2022-05-17 RX ADMIN — LACTULOSE 10 GRAM(S): 10 SOLUTION ORAL at 21:05

## 2022-05-17 NOTE — ED PROVIDER NOTE - CLINICAL SUMMARY MEDICAL DECISION MAKING FREE TEXT BOX
pt returns with minimal relief of her constipation w/o any abd pain or abd distension with soft non tender non distended abdomen on exam. CTAP negative 2 d a go, offered to repeat but pt refused. Pt has GI follow up on 5/25

## 2022-05-17 NOTE — ED ADULT TRIAGE NOTE - CHIEF COMPLAINT QUOTE
Pt seen 2 days ago with c/o constipation. States she went home and took a bottle of mag citrate followed by colonoscopy prep. Reports "watery diarrhea but no stools"  Pt is concerned about "having had no stools for 12 days". c/o bloating, no acute pain.

## 2022-05-17 NOTE — ED PROVIDER NOTE - PATIENT PORTAL LINK FT
You can access the FollowMyHealth Patient Portal offered by [facility] by registering at the following website: [email or web address]. By joining [employer]’s FollowMyHealth portal, you will also be able to view your health information using other applications (apps) compatible with our system.

## 2022-05-17 NOTE — ED PROVIDER NOTE - PHYSICAL EXAMINATION
Physical Exam    Vital Signs: I have reviewed the initial vital signs.  Constitutional: well-nourished, appears stated age, no acute distress  Eyes: PERRLA, EOM intact, RAPD absent, and symmetrical lids.  ENT: neck supple with no adenopathy, moist MM.  Cardiovascular: +S1/S2, no murmurs, regular rate, regular rhythm, well-perfused extremities  Respiratory: unlabored respiratory effort, clear to auscultation bilaterally, speaks in full sentences  Gastrointestinal: soft, non-tender abdomen, non distended, no guarding, no rebound

## 2022-05-17 NOTE — ED PROVIDER NOTE - OBJECTIVE STATEMENT
57 yo f no sig pmhx pw decreased stooling over the course of the last 2 wk with brown watery stools after bowel prep with mag citrate, prior to drinking mag citrate pt had no bowel movement for 2 wk in duration. Pt was seen in the ED with negative CTAP. Pt was advised to return to the ED for eval. No change in abd discomfort, no abd pain, no n/v, 1 loose water brown bowel movement. Pt was offered CTAP but did not want it at this time and return precautions were given.    I have reviewed available current nursing and previous documentation of past medical, surgical, family, and/or social history.

## 2022-05-20 DIAGNOSIS — Z88.0 ALLERGY STATUS TO PENICILLIN: ICD-10-CM

## 2022-05-20 DIAGNOSIS — K59.00 CONSTIPATION, UNSPECIFIED: ICD-10-CM

## 2022-11-01 NOTE — ED ADULT NURSE NOTE - BREATH SOUNDS, MLM
Interval History: No fevers documented overnight.   OR rescheduled to 11/2. Remains intubated until then.       Review of Systems   Unable to perform ROS: Intubated   Objective:     Vital Signs (Most Recent):  Temp: 97.8 °F (36.6 °C) (11/01/22 0301)  Pulse: 94 (11/01/22 1247)  Resp: 19 (11/01/22 1247)  BP: (!) 90/53 (11/01/22 0601)  SpO2: 98 % (11/01/22 1247) Vital Signs (24h Range):  Temp:  [97.8 °F (36.6 °C)-98.9 °F (37.2 °C)] 97.8 °F (36.6 °C)  Pulse:  [56-94] 94  Resp:  [14-32] 19  SpO2:  [98 %-100 %] 98 %  BP: ()/(50-74) 90/53     Weight: 63.5 kg (139 lb 15.9 oz)  Body mass index is 22.6 kg/m².    Estimated Creatinine Clearance: 77 mL/min (based on SCr of 0.8 mg/dL).    Physical Exam  Constitutional:       General: She is not in acute distress.     Appearance: She is not ill-appearing or toxic-appearing.   HENT:      Head: Normocephalic and atraumatic.      Mouth/Throat:      Comments: ETT    Eyes:      General:         Right eye: No discharge.         Left eye: No discharge.   Cardiovascular:      Rate and Rhythm: Normal rate and regular rhythm.   Pulmonary:      Effort: Pulmonary effort is normal. No respiratory distress.      Breath sounds: No stridor. No wheezing or rhonchi.   Abdominal:      General: There is no distension.      Palpations: Abdomen is soft.      Tenderness: There is no abdominal tenderness. There is no guarding.   Skin:     General: Skin is warm and dry.      Coloration: Skin is not jaundiced.      Findings: No bruising.      Comments: R midline  Neck drain  LUE/RUE - no drainage present       Significant Labs:   Microbiology Results (last 7 days)       Procedure Component Value Units Date/Time    Aerobic culture [[id number]]  (Abnormal)  (Susceptibility) Collected: 10/28/22 0906    Order Status: Completed Specimen: Abscess from Back Updated: 11/01/22 1059     Aerobic Bacterial Culture STAPHYLOCOCCUS AUREUS  Rare      Narrative:      Prevertebral Abscess    Aerobic culture [[id number]]   (Abnormal)  (Susceptibility) Collected: 10/28/22 0950    Order Status: Completed Specimen: Wound from Neck Updated: 11/01/22 1039     Aerobic Bacterial Culture STAPHYLOCOCCUS AUREUS  Rare      Narrative:      3) Osteodiscitis    Blood culture [[id number]] Collected: 10/27/22 0939    Order Status: Completed Specimen: Blood Updated: 11/01/22 1012     Blood Culture, Routine No growth after 5 days.    Narrative:      Rt wrist    Blood culture [[id number]] Collected: 10/27/22 0938    Order Status: Completed Specimen: Blood Updated: 11/01/22 1012     Blood Culture, Routine No growth after 5 days.    Narrative:      Rt AC    Blood culture [[id number]] Collected: 10/29/22 0623    Order Status: Completed Specimen: Blood from Peripheral, Foot, Left Updated: 11/01/22 0812     Blood Culture, Routine No Growth to date      No Growth to date      No Growth to date      No Growth to date    Blood culture [[id number]] Collected: 10/29/22 0621    Order Status: Completed Specimen: Blood from Peripheral, Foot, Right Updated: 11/01/22 0812     Blood Culture, Routine No Growth to date      No Growth to date      No Growth to date      No Growth to date    Culture, Anaerobe [[id number]] Collected: 10/28/22 1029    Order Status: Completed Specimen: Wound from Neck Updated: 11/01/22 0727     Anaerobic Culture Culture in progress    Narrative:      4) Epidural phlegman    Culture, Anaerobe [[id number]] Collected: 10/28/22 0950    Order Status: Completed Specimen: Wound from Neck Updated: 11/01/22 0724     Anaerobic Culture No anaerobes isolated    Narrative:      3) Osteodiscitis    Culture, Anaerobe [[id number]] Collected: 10/28/22 0924    Order Status: Completed Specimen: Abscess from Neck Updated: 11/01/22 0722     Anaerobic Culture No anaerobes isolated    Narrative:      2) Prevertebral absess    Culture, Anaerobe [[id number]] Collected: 10/28/22 0906    Order Status: Completed Specimen: Abscess from Back Updated: 11/01/22 0722     Anaerobic  Culture No anaerobes isolated    Narrative:      Prevertebral Abscess    AFB Culture & Smear [[id number]] Collected: 10/28/22 1029    Order Status: Completed Specimen: Wound from Neck Updated: 10/31/22 1250     AFB Culture & Smear Culture in progress     AFB CULTURE STAIN No acid fast bacilli seen.    Narrative:      4) Epidural phlegman    AFB Culture & Smear [[id number]] Collected: 10/28/22 0924    Order Status: Completed Specimen: Abscess from Neck Updated: 10/31/22 1250     AFB Culture & Smear Culture in progress     AFB CULTURE STAIN No acid fast bacilli seen.    Narrative:      2) Prevertebral absess    AFB Culture & Smear [[id number]] Collected: 10/28/22 0906    Order Status: Completed Specimen: Abscess from Back Updated: 10/31/22 1250     AFB Culture & Smear Culture in progress     AFB CULTURE STAIN No acid fast bacilli seen.    Narrative:      Prevertebral Abscess    AFB Culture & Smear [[id number]] Collected: 10/28/22 0950    Order Status: Completed Specimen: Wound from Neck Updated: 10/31/22 1250     AFB Culture & Smear Culture in progress     AFB CULTURE STAIN No acid fast bacilli seen.    Narrative:      3) Osteodiscitis    Fungus culture [[id number]] Collected: 10/28/22 0950    Order Status: Completed Specimen: Wound from Neck Updated: 10/31/22 0958     Fungus (Mycology) Culture Culture in progress    Narrative:      3) Osteodiscitis    Fungus culture [[id number]] Collected: 10/28/22 1029    Order Status: Completed Specimen: Wound from Neck Updated: 10/31/22 0958     Fungus (Mycology) Culture Culture in progress    Narrative:      4) Epidural phlegman    Fungus culture [[id number]] Collected: 10/28/22 0906    Order Status: Completed Specimen: Abscess from Back Updated: 10/31/22 0958     Fungus (Mycology) Culture Culture in progress    Narrative:      Prevertebral Abscess    Fungus culture [[id number]] Collected: 10/28/22 0924    Order Status: Completed Specimen: Abscess from Neck Updated: 10/31/22 0958      Fungus (Mycology) Culture Culture in progress    Narrative:      2) Prevertebral absess    Aerobic culture [[id number]]  (Abnormal)  (Susceptibility) Collected: 10/28/22 0924    Order Status: Completed Specimen: Abscess from Neck Updated: 10/31/22 0928     Aerobic Bacterial Culture STAPHYLOCOCCUS AUREUS  Moderate      Narrative:      2) Prevertebral absess    Aerobic culture [[id number]] Collected: 10/28/22 1029    Order Status: Completed Specimen: Wound from Neck Updated: 10/31/22 0901     Aerobic Bacterial Culture No growth    Narrative:      4) Epidural phlegman    Blood culture #2 **CANNOT BE ORDERED STAT** [[id number]] Collected: 10/25/22 0917    Order Status: Completed Specimen: Blood from Peripheral, Wrist, Left Updated: 10/30/22 1012     Blood Culture, Routine No growth after 5 days.    Gram stain [[id number]] Collected: 10/28/22 0950    Order Status: Completed Specimen: Wound from Neck Updated: 10/28/22 1538     Gram Stain Result No WBC's      No organisms seen    Narrative:      3) Osteodiscitis    Gram stain [[id number]] Collected: 10/28/22 1029    Order Status: Completed Specimen: Wound from Neck Updated: 10/28/22 1423     Gram Stain Result No WBC's      No organisms seen    Narrative:      4) Epidural phlegman    Gram stain [[id number]] Collected: 10/28/22 0924    Order Status: Completed Specimen: Abscess from Neck Updated: 10/28/22 1046     Gram Stain Result No WBC's      No organisms seen    Narrative:      2) Prevertebral absess    Gram stain [[id number]] Collected: 10/28/22 0906    Order Status: Completed Specimen: Abscess from Back Updated: 10/28/22 1044     Gram Stain Result No WBC's      No organisms seen    Narrative:      Prevertebral Abscess    Blood culture #1 **CANNOT BE ORDERED STAT** [[id number]]  (Abnormal)  (Susceptibility) Collected: 10/25/22 0917    Order Status: Completed Specimen: Blood from Peripheral, Antecubital, Right Updated: 10/28/22 1040     Blood Culture, Routine Gram stain yumi  bottle: Gram positive cocci in clusters resembling Staph      Results called to and read back by: Jaxon Allen RN  17:39  10/26/2022      STAPHYLOCOCCUS AUREUS            Significant Imaging: I have reviewed all pertinent imaging results/findings within the past 24 hours.   Clear

## 2023-03-08 NOTE — ED ADULT NURSE NOTE - MUSCULOSKELETAL ASSESSMENT
Subjective:       Patient ID: Carly Magaña is a 81 y.o. female.    Chief Complaint: Cough (Since being sick; possible sinus )    Cough  This is a chronic problem. The current episode started more than 1 month ago. The problem has been unchanged. Pertinent negatives include no chest pain, chills, ear pain, eye redness, headaches or rash.   Past Medical History:   Diagnosis Date    History of basal cell carcinoma of skin 11/19/2019    left upper medial back     History of dysplastic nevus     Hypertension      Past Surgical History:   Procedure Laterality Date    APPENDECTOMY      HYSTERECTOMY      TONSILLECTOMY       Family History   Problem Relation Age of Onset    Cancer Mother         Lymphoma    Cancer Father         colon    Melanoma Neg Hx      Review of patient's allergies indicates:   Allergen Reactions    Shellfish containing products      Note: - [employer] 02/28/2018    Codeine Rash    Penicillin v potassium Rash    Sulfa (sulfonamide antibiotics) Rash    Sulfur Rash    Tetracycline Rash      Social History     Tobacco Use    Smoking status: Never    Smokeless tobacco: Never   Substance Use Topics    Alcohol use: Never    Drug use: Never      Review of Systems   Constitutional:  Negative for chills, activity change and night sweats.   HENT:  Negative for congestion and ear pain.    Eyes:  Negative for redness and itching.   Respiratory:  Positive for cough.    Cardiovascular:  Negative for chest pain and palpitations.   Musculoskeletal:  Negative for arthralgias and back pain.   Skin:  Negative for rash.   Gastrointestinal:  Negative for abdominal pain and abdominal distention.   Neurological:  Negative for dizziness and headaches.   Hematological:  Negative for adenopathy. Does not bruise/bleed easily.   Psychiatric/Behavioral:  Negative for confusion. The patient is not nervous/anxious.      Objective:      Physical Exam   Constitutional: She is oriented to person, place, and time. She appears  well-developed and well-nourished.   HENT:   Head: Normocephalic.   Nose: Nose normal.   Mouth/Throat: Oropharynx is clear and moist.   Neck: No JVD present. No thyromegaly present.   Cardiovascular: Normal rate, regular rhythm, normal heart sounds and intact distal pulses.   Pulmonary/Chest: Normal expansion, hyperinflation, symmetric chest wall expansion, effort normal and breath sounds normal.   Abdominal: Soft. Bowel sounds are normal.   Musculoskeletal:         General: Normal range of motion.      Cervical back: Normal range of motion and neck supple.   Lymphadenopathy: No supraclavicular adenopathy is present.     She has no cervical adenopathy.   Neurological: She is alert and oriented to person, place, and time. She has normal reflexes.   Skin: Skin is warm and dry.   Psychiatric: She has a normal mood and affect. Her behavior is normal.   Personal Diagnostic Review  none pertinent    No flowsheet data found.      Assessment:       1. Hypertension, unspecified type    2. Sjogren syndrome, unspecified    3. Health care maintenance    4. Rheumatoid arthritis involving both hands, unspecified whether rheumatoid factor present          Outpatient Encounter Medications as of 3/8/2023   Medication Sig Dispense Refill    amLODIPine (NORVASC) 10 MG tablet Take 10 mg by mouth once daily.      aspirin (ECOTRIN) 325 MG EC tablet Take 325 mg by mouth once daily.      cetirizine (ZYRTEC) 10 MG tablet 1 tablet Orally Once a day      hydroCHLOROthiazide (HYDRODIURIL) 25 MG tablet Take 12.5 mg by mouth once daily. Take 12.5mg once daily      hydrOXYchloroQUINE (PLAQUENIL) 200 mg tablet Take 200 mg by mouth 2 (two) times daily.      naproxen sodium (ALEVE ORAL) Take by mouth.      [DISCONTINUED] diazePAM (VALIUM) 5 MG tablet TAKE 1 TABLET BY MOUTH 30 MIN BEFORE MRI      [DISCONTINUED] HYDROcodone-acetaminophen (NORCO) 5-325 mg per tablet       [DISCONTINUED] traMADoL (ULTRAM) 50 mg tablet Take 50 mg by mouth every 6 (six)  hours as needed.       No facility-administered encounter medications on file as of 3/8/2023.     Orders Placed This Encounter   Procedures    CBC Auto Differential     Standing Status:   Future     Standing Expiration Date:   5/6/2024    Comprehensive Metabolic Panel     Standing Status:   Future     Standing Expiration Date:   5/6/2024       Plan:       Problem List Items Addressed This Visit          Cardiac/Vascular    Hypertension - Primary     She takes Norvasc and diuretic in her blood pressure is usually very good at home it is always elevated doctors office         Relevant Orders    CBC Auto Differential    Comprehensive Metabolic Panel       Immunology/Multi System    Rheumatoid arthritis     Under good control with Plaquenil            Other    Health care maintenance     She gets yearly mammogram at her request does not need any further colonoscopies after 79 she is extremely active         Sjogren syndrome, unspecified                    WDL

## 2023-07-11 NOTE — ED PROVIDER NOTE - CPE EDP GASTRO NORM
Requested Prescriptions     Pending Prescriptions Disp Refills    omeprazole (PRILOSEC) 40 MG delayed release capsule [Pharmacy Med Name: OMEPRAZOLE 40MG CAPSULES] 90 capsule 0     Sig: TAKE 1 CAPSULE BY MOUTH DAILY       Patient last seen on: 06/21/23  Date of last refill: 04/21/23  Future appts: 07/21/23
normal...

## 2023-07-15 NOTE — ED ADULT NURSE NOTE - PRIMARY CARE PROVIDER
nonaffilaited Niacinamide Counseling: I recommended taking niacin or niacinamide, also know as vitamin B3, twice daily. Recent evidence suggests that taking vitamin B3 (500 mg twice daily) can reduce the risk of actinic keratoses and non-melanoma skin cancers. Side effects of vitamin B3 include flushing and headache.

## 2023-08-03 NOTE — ED ADULT NURSE NOTE - CHPI ED NUR SYMPTOMS POS
Post-Care Instructions: I reviewed with the patient in detail post-care instructions. Patient is to wear sunprotection, and avoid picking at any of the treated lesions. Pt may apply Vaseline to crusted or scabbing areas. Duration Of Freeze Thaw-Cycle (Seconds): 10 Render Note In Bullet Format When Appropriate: No Show Applicator Variable?: Yes Consent: The patient's consent was obtained including but not limited to risks of crusting, scabbing, blistering, scarring, darker or lighter pigmentary change, recurrence, incomplete removal and infection. Number Of Freeze-Thaw Cycles: 1 freeze-thaw cycle Detail Level: Zone Medical Necessity Information: It is in your best interest to select a reason for this procedure from the list below. All of these items fulfill various CMS LCD requirements except the new and changing color options. Spray Paint Text: The liquid nitrogen was applied to the skin utilizing a spray paint frosting technique. Medical Necessity Clause: This procedure was medically necessary because the lesions that were treated were: Abnormal labs

## 2023-08-29 NOTE — ED ADULT NURSE NOTE - BREATHING, MLM
Spontaneous, unlabored and symmetrical Solaraze Pregnancy And Lactation Text: This medication is Pregnancy Category B and is considered safe. There is some data to suggest avoiding during the third trimester. It is unknown if this medication is excreted in breast milk.

## 2023-11-17 PROBLEM — Z00.00 ENCOUNTER FOR PREVENTIVE HEALTH EXAMINATION: Status: ACTIVE | Noted: 2023-11-17

## 2024-03-31 NOTE — ED ADULT TRIAGE NOTE - AS TEMP SITE
Patient has Abnormal Magnesium: hypomagnesemia. Will continue to monitor electrolytes closely. Will replace the affected electrolytes and repeat labs to be done after interventions completed. The patient's magnesium results have been reviewed and are listed below.  Recent Labs   Lab 03/31/24  0533   MG 1.3*       oral

## 2024-07-26 ENCOUNTER — EMERGENCY (EMERGENCY)
Facility: HOSPITAL | Age: 59
LOS: 1 days | Discharge: HOME | End: 2024-07-26
Attending: EMERGENCY MEDICINE | Admitting: EMERGENCY MEDICINE

## 2024-07-26 VITALS
WEIGHT: 125 LBS | DIASTOLIC BLOOD PRESSURE: 66 MMHG | OXYGEN SATURATION: 96 % | TEMPERATURE: 98 F | RESPIRATION RATE: 16 BRPM | SYSTOLIC BLOOD PRESSURE: 99 MMHG | HEART RATE: 91 BPM

## 2024-07-26 DIAGNOSIS — Z98.890 OTHER SPECIFIED POSTPROCEDURAL STATES: Chronic | ICD-10-CM

## 2024-07-26 DIAGNOSIS — Z98.82 BREAST IMPLANT STATUS: Chronic | ICD-10-CM

## 2024-07-26 DIAGNOSIS — R09.81 NASAL CONGESTION: ICD-10-CM

## 2024-07-26 DIAGNOSIS — Z88.1 ALLERGY STATUS TO OTHER ANTIBIOTIC AGENTS: ICD-10-CM

## 2024-07-26 DIAGNOSIS — J34.89 OTHER SPECIFIED DISORDERS OF NOSE AND NASAL SINUSES: ICD-10-CM

## 2024-07-26 DIAGNOSIS — Z88.0 ALLERGY STATUS TO PENICILLIN: ICD-10-CM

## 2024-07-26 PROCEDURE — 99283 EMERGENCY DEPT VISIT LOW MDM: CPT

## 2024-07-26 NOTE — ED PROVIDER NOTE - CLINICAL SUMMARY MEDICAL DECISION MAKING FREE TEXT BOX
58-year-old female presents emergency department for weeks of sinus pressure.  No fevers no sign of airway compromise.  Will give patient ENT follow-up to see if she get an earlier appointment.

## 2024-07-26 NOTE — ED ADULT NURSE NOTE - CAS EDP DISCH TYPE
Home CONSTITUTIONAL: No weakness, fevers or chills  EYES/ENT: No visual changes;  No vertigo or throat pain   NECK: No pain or stiffness  RESPIRATORY: No cough, wheezing, hemoptysis; No shortness of breath  CARDIOVASCULAR: No chest pain or palpitations  GASTROINTESTINAL: No abdominal or epigastric pain. No nausea, vomiting, or hematemesis; No diarrhea or constipation. No melena or hematochezia.  GENITOURINARY: No dysuria, frequency or hematuria  NEUROLOGICAL: No numbness or weakness  SKIN: No itching, burning, rashes, or lesions   All other review of systems is negative unless indicated above. CONSTITUTIONAL: +fevers  No weakness, or chills  EYES/ENT: No visual changes;  No vertigo or throat pain   NECK: No pain or stiffness  RESPIRATORY: No cough, wheezing, hemoptysis; No shortness of breath  CARDIOVASCULAR: No chest pain or palpitations  GASTROINTESTINAL: +reflux Sx with spicy food. No abdominal or epigastric pain. No nausea, vomiting, or hematemesis; No diarrhea or constipation. No melena or hematochezia.  GENITOURINARY: No dysuria, frequency or hematuria  NEUROLOGICAL: +frontal headache (chronic) No numbness or weakness +shooting pain in RLE to groin  SKIN: +RLE warmth, pain, swelling, +chronic skin changes from eczema No itching, burning,   HEME: no bruising or bleeding  All other review of systems is negative unless indicated above.

## 2024-07-26 NOTE — ED PROVIDER NOTE - PATIENT PORTAL LINK FT
You can access the FollowMyHealth Patient Portal offered by [facility] by registering at the following website: [email or web address]. By joining [employer]’s FollowMyHealth portal, you will also be able to view your health information using other applications (apps) compatible with our system.

## 2024-07-26 NOTE — ED PROVIDER NOTE - CARE PROVIDER_API CALL
Rip Etienne  Otolaryngology  [address]  [place] [address]  Phone: [phone]  Fax: [phone]  Follow Up Time: 7-10 Days   No

## 2024-07-26 NOTE — ED PROVIDER NOTE - NSFOLLOWUPINSTRUCTIONS_ED_ALL_ED_FT
Sinus Pain  Outline of a head showing the sinuses.  Sinus pain may occur when your sinuses become clogged or swollen. Sinuses are air-filled spaces in your skull that are behind the bones of your face and forehead. Sinus pain can range from mild to severe.    What are the causes?  Sinus pain can result from various conditions that affect the sinuses. Common causes include:  Colds.  Sinus infections.  Allergies.  What are the signs or symptoms?  The main symptom of this condition is pain or pressure in your face, forehead, ears, or upper teeth. People who have sinus pain often have other symptoms, such as:  Congested or runny nose.  Fever.  Inability to smell.  Headache.  Weather changes can make symptoms worse.    How is this diagnosed?  Your health care provider will diagnose this condition based on your symptoms and a physical exam. If you have pain that keeps coming back or does not go away, your health care provider may recommend more testing. This may include:  Imaging tests, such as a CT scan or MRI, to check for problems with your sinuses.  Examination of your sinuses using a thin tool with a camera that is inserted through your nose (endoscopy).  How is this treated?  Treatment for this condition depends on the cause.  Sinus pain that is caused by a sinus infection may be treated with antibiotic medicine.  Sinus pain that is caused by congestion may be helped by rinsing out (flushing) the nose and sinuses with saline solution.  Sinus pain that is caused by allergies may be helped by allergy medicines (antihistamines) and medicated nasal sprays.  Sinus surgery may be needed in some cases if other treatments do not help.  Follow these instructions at home:  General instructions    If directed:  Apply a warm, moist washcloth to your face to help relieve pain.  Use a nasal saline wash. Follow the directions on the bottle or box.  Hydrate and humidify    Drink enough water to keep your urine clear or pale yellow. Staying hydrated will help to thin your mucus.  Use a humidifier if your home is dry.  Inhale steam for 10–15 minutes, 3–4 times a day or as told by your health care provider. You can do this in the bathroom while a hot shower is running.  Limit your exposure to cool or dry air.  Medicines    A person using nasal spray.  Take over-the-counter and prescription medicines only as told by your health care provider.  If you were prescribed an antibiotic medicine, take it as told by your health care provider. Do not stop taking the antibiotic even if you start to feel better.  If you have congestion, use a nasal spray to help lessen pressure.  Contact a health care provider if:  You have sinus pain more than one time a week.  You have sensitivity to light or sound.  You develop a fever.  You feel nauseous or you vomit.  Your sinus pain or headache does not get better with treatment.  Get help right away if:  You have vision problems.  You have sudden, severe pain in your face or head.  You have a seizure.  You are confused.  You have a stiff neck.  Summary  Sinus pain occurs when your sinuses become clogged or swollen.  Sinus pain can result from various conditions that affect the sinuses, such as a cold, a sinus infection, or an allergy.  Treatment for this condition depends on the cause. It may include medicine, such as antibiotics or antihistamines.  This information is not intended to replace advice given to you by your health care provider. Make sure you discuss any questions you have with your health care provider.

## 2024-07-26 NOTE — ED PROVIDER NOTE - PHYSICAL EXAMINATION
Const: No apparent distress  Eyes: PERRL, no conjunctival injection  HENT:  Neck supple without meningismus, midline non-swollen uvula. No stridor or drooling.   CV: RRR, Warm, well-perfused extremities  RESP: CTA B/L, no tachypnea   GI: soft, non-tender, non-distended  MSK: No gross deformities appreciated  Skin: Warm, dry. No rashes  Neuro: Alert,

## 2024-07-26 NOTE — ED PROVIDER NOTE - OBJECTIVE STATEMENT
58-year-old female presents emergency department for weeks of nasal congestion.  Patient states she uses a Shireen pot every day, homeopathic spray as well as decongestants and allergy medications intermittently; however, she still feels that her sinuses are clogged.  No fever no chills.  Patient has an appointment with ENT August 20 but she does not know if she can make it till then.  No shortness of breath no chest pain.

## 2025-07-10 ENCOUNTER — EMERGENCY (EMERGENCY)
Facility: HOSPITAL | Age: 60
LOS: 1 days | End: 2025-07-10
Attending: EMERGENCY MEDICINE | Admitting: EMERGENCY MEDICINE
Payer: MEDICAID

## 2025-07-10 VITALS
HEART RATE: 96 BPM | TEMPERATURE: 98 F | OXYGEN SATURATION: 98 % | SYSTOLIC BLOOD PRESSURE: 131 MMHG | RESPIRATION RATE: 17 BRPM | DIASTOLIC BLOOD PRESSURE: 68 MMHG | WEIGHT: 125 LBS

## 2025-07-10 DIAGNOSIS — Z98.890 OTHER SPECIFIED POSTPROCEDURAL STATES: Chronic | ICD-10-CM

## 2025-07-10 DIAGNOSIS — Z98.82 BREAST IMPLANT STATUS: Chronic | ICD-10-CM

## 2025-07-10 PROCEDURE — 99283 EMERGENCY DEPT VISIT LOW MDM: CPT

## 2025-07-10 RX ORDER — ACETAMINOPHEN 500 MG/5ML
975 LIQUID (ML) ORAL ONCE
Refills: 0 | Status: COMPLETED | OUTPATIENT
Start: 2025-07-10 | End: 2025-07-10

## 2025-07-10 RX ORDER — IBUPROFEN 200 MG
600 TABLET ORAL ONCE
Refills: 0 | Status: COMPLETED | OUTPATIENT
Start: 2025-07-10 | End: 2025-07-10

## 2025-07-10 RX ADMIN — Medication 600 MILLIGRAM(S): at 16:17

## 2025-07-10 NOTE — ED PROVIDER NOTE - CLINICAL SUMMARY MEDICAL DECISION MAKING FREE TEXT BOX
59-year-old female presents with complaint of right upper extremity pain.  Patient reports right lateral wrist and distal forearm pain over the past 2 weeks.  States she is right-handed and carries her cat in her right arm much of the time while she is home.  Denies fall/trauma, fever/chills, rash.  Exam as noted.  patient already in right wrist splint.  Plan: meds, follow-up with PT/orthopedics.

## 2025-07-10 NOTE — ED PROVIDER NOTE - PATIENT PORTAL LINK FT
You can access the FollowMyHealth Patient Portal offered by [facility] by registering at the following website: [email or web address]. By joining [employer]’s FollowMyHealth portal, you will also be able to view your health information using other applications (apps) compatible with our system.

## 2025-07-10 NOTE — ED PROVIDER NOTE - NSFOLLOWUPINSTRUCTIONS_ED_ALL_ED_FT
Follow-up with PT and orthopedics - Our primary care and specialty care access coordinators can help you schedule a follow up appointment. You can reach them between Monday to Friday from 9am to 5pm at [phone].    Wrist Pain, Adult  There are many things that can cause wrist pain. Some common causes include:  An injury to the wrist area, such as a sprain, strain, or fracture.  Overuse of the joint.  A condition that causes increased pressure on a nerve in the wrist (carpal tunnel syndrome).  Wear and tear of the joints that occurs with aging (osteoarthritis).  Other types of joint inflammation and stiffness (arthritis).  Sometimes, the cause of wrist pain is not known. Often, the pain goes away when you follow instructions from your health care provider for relieving pain at home, such as resting the wrist, icing the wrist, or using a splint or an elastic wrap for a short time. If your wrist pain continues, it is important to tell your health care provider.    Follow these instructions at home:  If you have a splint or elastic wrap:    Wear the splint or wrap as told by your health care provider. Remove it only as told by your health care provider. Ask your health care provider if you may remove it for bathing.  Loosen the splint or wrap if your fingers tingle, become numb, or turn cold and blue.  Check the skin around the splint or wrap every day. Tell your health care provider about any concerns.  Keep the splint or wrap clean.  If the splint or wrap is not waterproof:  Do not let it get wet.  Cover it with a watertight covering when you take a bath or shower.  Managing pain, stiffness, and swelling      If directed, put ice on the painful area. To do this:  If you have a removable splint or wrap, remove it as told by your health care provider.  Put ice in a plastic bag.  Place a towel between your skin and the bag or between your splint or wrap and the bag.  Leave the ice on for 20 minutes, 2–3 times a day.  Move your fingers often to reduce stiffness and swelling.  Raise (elevate) the injured area above the level of your heart while you are sitting or lying down.  Activity    Rest your affected wrist as told by your health care provider.  Return to your normal activities as told by your health care provider. Ask your health care provider what activities are safe for you.  Ask your health care provider when it is safe to drive if you have a splint or wrap on your wrist.  Do exercises as told by your health care provider.  General instructions    Pay attention to any changes in your symptoms.  Take over-the-counter and prescription medicines only as told by your health care provider.  Keep all follow-up visits as told by your health care provider. This is important.  Contact a health care provider if:  You have a sudden, sharp pain in the wrist, hand, or arm that is different or new.  The swelling or bruising on your wrist or hand gets worse.  Your skin becomes red, gets a rash, or has open sores.  Your pain does not get better or it gets worse.  You have a fever or chills.  Get help right away if:  You lose feeling in your fingers or hand.  Your fingers turn white, very red, or cold and blue.  You cannot move your fingers.  Summary  Wrist pain in an adult has many different causes.  If your wrist pain continues, it is important to tell your health care provider.  You may need to wear a splint or an elastic wrap for a short period of time.  Return to your normal activities as told by your health care provider. Ask your health care provider what activities are safe for you.

## 2025-07-10 NOTE — ED PROVIDER NOTE - OBJECTIVE STATEMENT
59-year-old female presents with complaint of right upper extremity pain.  Patient reports right lateral wrist and distal forearm pain over the past 2 weeks.  States she is right-handed and carries her cat in her right arm much of the time while she is home.  Denies fall/trauma, fever/chills, rash.

## 2025-07-10 NOTE — ED PROVIDER NOTE - PHYSICAL EXAMINATION
Vital Signs: I have reviewed the initial vital signs.  Constitutional: appears stated age, no acute distress  Eyes: Sclera clear, EOMI.  Cardiovascular: S1 and S2, regular rate, regular rhythm, well-perfused extremities, radial pulses equal and 2+, pedal pulses 2+ and equal  Respiratory: unlabored respiratory effort, clear to auscultation bilaterally no wheezing, rales, or rhonchi  Musculoskeletal: supple neck, no lower extremity edema, + tenderness to palpation right lateral distal forearm, + right finkelstein maneuver, no bony tenderness to right hand or right wrist  Integumentary: warm, dry, no rash  Neurologic: awake, alert, oriented x3, extremities’ motor and sensory functions grossly intact

## 2025-07-10 NOTE — ED PROVIDER NOTE - ATTENDING APP SHARED VISIT CONTRIBUTION OF CARE
Patient seen primarily by the AJ.  I also performed a face-to-face evaluation of the patient.  I agree with the history and management as documented above.  Briefly, patient presents with hand pain with exam consistent with de Quervain's tenosynovitis.  Instructions given to the patient for symptomatic relief including NSAIDs, continued use of wrist splint, and orthopedics outpatient follow-up.

## 2025-07-10 NOTE — ED ADULT NURSE NOTE - OBJECTIVE STATEMENT
Pt is c/o R wrist pain, unk injury, states she think she tore something, denies improvement with Tylenol yesterday, arrives w/ brace.

## 2025-07-13 DIAGNOSIS — M25.531 PAIN IN RIGHT WRIST: ICD-10-CM

## 2025-07-13 DIAGNOSIS — Z88.0 ALLERGY STATUS TO PENICILLIN: ICD-10-CM

## 2025-07-13 DIAGNOSIS — Z88.1 ALLERGY STATUS TO OTHER ANTIBIOTIC AGENTS: ICD-10-CM

## 2025-07-13 DIAGNOSIS — M79.631 PAIN IN RIGHT FOREARM: ICD-10-CM
